# Patient Record
Sex: FEMALE | Race: WHITE | NOT HISPANIC OR LATINO | Employment: UNEMPLOYED | URBAN - METROPOLITAN AREA
[De-identification: names, ages, dates, MRNs, and addresses within clinical notes are randomized per-mention and may not be internally consistent; named-entity substitution may affect disease eponyms.]

---

## 2018-12-07 ENCOUNTER — LAB REQUISITION (OUTPATIENT)
Dept: LAB | Facility: HOSPITAL | Age: 18
End: 2018-12-07

## 2018-12-07 DIAGNOSIS — N39.0 URINARY TRACT INFECTION: ICD-10-CM

## 2018-12-07 PROCEDURE — 87086 URINE CULTURE/COLONY COUNT: CPT | Performed by: UROLOGY

## 2018-12-09 LAB — BACTERIA SPEC AEROBE CULT: NO GROWTH

## 2019-08-20 ENCOUNTER — OFFICE VISIT (OUTPATIENT)
Dept: OBSTETRICS AND GYNECOLOGY | Facility: CLINIC | Age: 19
End: 2019-08-20

## 2019-08-20 VITALS
BODY MASS INDEX: 20.15 KG/M2 | WEIGHT: 125.4 LBS | HEIGHT: 66 IN | SYSTOLIC BLOOD PRESSURE: 114 MMHG | DIASTOLIC BLOOD PRESSURE: 72 MMHG

## 2019-08-20 DIAGNOSIS — N94.3 PMS (PREMENSTRUAL SYNDROME): Primary | ICD-10-CM

## 2019-08-20 DIAGNOSIS — R10.2 VAGINAL PAIN: ICD-10-CM

## 2019-08-20 DIAGNOSIS — Z00.00 ANNUAL PHYSICAL EXAM: ICD-10-CM

## 2019-08-20 PROBLEM — N76.0 ACUTE VAGINITIS: Status: ACTIVE | Noted: 2019-08-20

## 2019-08-20 LAB
BILIRUB BLD-MCNC: NEGATIVE MG/DL
CLARITY, POC: CLEAR
COLOR UR: YELLOW
GLUCOSE UR STRIP-MCNC: NEGATIVE MG/DL
KETONES UR QL: NEGATIVE
LEUKOCYTE EST, POC: ABNORMAL
NITRITE UR-MCNC: NEGATIVE MG/ML
PH UR: 6 [PH] (ref 5–8)
PROT UR STRIP-MCNC: NEGATIVE MG/DL
RBC # UR STRIP: NEGATIVE /UL
SP GR UR: 1 (ref 1–1.03)
UROBILINOGEN UR QL: NORMAL

## 2019-08-20 PROCEDURE — 99204 OFFICE O/P NEW MOD 45 MIN: CPT | Performed by: OBSTETRICS & GYNECOLOGY

## 2019-08-20 RX ORDER — NORETHINDRONE ACETATE AND ETHINYL ESTRADIOL 1MG-20(21)
1 KIT ORAL DAILY
Qty: 84 TABLET | Refills: 3 | Status: SHIPPED | OUTPATIENT
Start: 2019-08-20 | End: 2021-03-23

## 2019-08-20 NOTE — PROGRESS NOTES
GYN Exam    CC- New gyn    Aggie Mitchell is a 18 y.o. female who presents to [facility] care.  Pt is a new pt to me. Periods are regular every 28-30 days, lasting 5 days. Dysmenorrhea:moderate, occurring first 1-2 days of flow. Cyclic symptoms include breast tenderness and insomnia. No intermenstrual bleeding, spotting, or discharge.  Patient is sexually active  yes - same sex relationship . Patient is satisfied with her contraception yes: none.  Pt reports that she is having cyclical joint pain of shoulder bones,knees and ankles and IBS exacerbations. Was seen by her physician and PRANAV and RF negative. Reporting recurrent UTIs- seen by Urology and had MRI with no findings. Pt reporting sensitivity of vulva and vagina. States that she has a hx of dysuria but no documented UTI. She reports burning. She is sexually active in same sex relationship and sex is uncomfortable to her.    OB History     No data available          Current contraception: none  History of abnormal Pap smear: no  History of abnormal mammogram: no  Family history of uterine, colon or ovarian cancer: paternal GGM  of colon cancer  Family history of breast cancer: no    Health Maintenance   Topic Date Due   • ANNUAL PHYSICAL  2001   • HPV VACCINES (1 - Female 3-dose series) 2013   • MENINGOCOCCAL VACCINE (Normal Risk) (1 - 2-dose series) 2014   • TDAP/TD VACCINES (1 - Tdap) 2017   • CHLAMYDIA SCREENING  2019   • INFLUENZA VACCINE  2019       History reviewed. No pertinent past medical history.    History reviewed. No pertinent surgical history.      Current Outpatient Medications:   •  norethindrone-ethinyl estradiol FE (JUNEL FE 1/20) 1-20 MG-MCG per tablet, Take 1 tablet by mouth Daily., Disp: 84 tablet, Rfl: 3  •  PEPPERMINT OIL PO, Take  by mouth., Disp: , Rfl:     No Known Allergies    Social History     Tobacco Use   • Smoking status: Former Smoker   Substance Use Topics   • Alcohol use: Not on file   •  "Drug use: Not on file       History reviewed. No pertinent family history.    Review of Systems   Constitutional: Negative for activity change, appetite change and unexpected weight change.   Gastrointestinal: Positive for constipation and diarrhea. Negative for abdominal distention and abdominal pain.   Genitourinary: Positive for dyspareunia, dysuria, frequency, menstrual problem and vaginal pain. Negative for vaginal bleeding and vaginal discharge.   Musculoskeletal: Positive for arthralgias, joint swelling and myalgias.   Neurological: Negative for weakness.   Psychiatric/Behavioral: Negative for dysphoric mood. The patient is not nervous/anxious.    All other systems reviewed and are negative.      /72   Ht 167.6 cm (66\")   Wt 56.9 kg (125 lb 6.4 oz)   LMP 08/08/2019 (Approximate)   BMI 20.24 kg/m²     Physical Exam   Constitutional: She is oriented to person, place, and time. She appears well-developed and well-nourished. No distress.   HENT:   Head: Normocephalic and atraumatic.   Cardiovascular: Normal rate and regular rhythm.   Pulmonary/Chest: Effort normal. No respiratory distress.   Abdominal: Soft. She exhibits no distension. There is no tenderness.   Genitourinary: There is no rash, tenderness, lesion or injury on the right labia. There is no rash, tenderness, lesion or injury on the left labia. There is tenderness in the vagina. No erythema or bleeding in the vagina. No foreign body in the vagina. No signs of injury around the vagina. No vaginal discharge found.   Musculoskeletal: Normal range of motion. She exhibits no edema, tenderness or deformity.   Neurological: She is alert and oriented to person, place, and time. No cranial nerve deficit. Coordination normal.   Skin: Skin is warm. She is not diaphoretic. No pallor.   Healed scars on forearms from cutting   Psychiatric: She has a normal mood and affect. Her behavior is normal. Judgment and thought content normal.   Vitals " reviewed.         Assessment/Plan    1) GYN HM: Check pap age 19. SBE demonstrated and encouraged.  2) Cyclical joint pain and IBS exacerbations (PMS): Will start OCPs to see if this helps with her symptoms. Pt instructed on how to take.  3) Contraception: Same sex relationship  4) Dysuria: Ucx noted to be negative. Check Ureaplasm/Mycoplasm  5) Hx of PTSD: Pt to notify me if sx worsen on OCPs. Pt reports that she is on no meds and is stable.  6) Smoking Status: nonsmoker  7) Follow up 6 weeks       Diagnoses and all orders for this visit:    PMS (premenstrual syndrome)    Annual physical exam  -     POC Urinalysis Dipstick    Vaginal pain  -     Genital Mycoplasmas BRIANNA, Swab - Swab, Vagina    Other orders  -     PEPPERMINT OIL PO; Take  by mouth.  -     norethindrone-ethinyl estradiol FE (JUNEL FE 1/20) 1-20 MG-MCG per tablet; Take 1 tablet by mouth Daily.          Tammy Childers, DO  8/25/2019  11:24 AM

## 2019-08-23 LAB
LABORATORY COMMENT REPORT: NORMAL
M GENITALIUM DNA SPEC QL NAA+PROBE: NEGATIVE
M HOMINIS DNA SPEC QL NAA+PROBE: NEGATIVE
UREAPLASMA DNA SPEC QL NAA+PROBE: NEGATIVE

## 2019-09-12 ENCOUNTER — TELEPHONE (OUTPATIENT)
Dept: OBSTETRICS AND GYNECOLOGY | Facility: CLINIC | Age: 19
End: 2019-09-12

## 2019-09-12 NOTE — TELEPHONE ENCOUNTER
Pt states her mental health is not doing well while on the BC she wants to know if she needs to stop it and try something new or if she shouold come in early and just talk to you.

## 2019-10-15 ENCOUNTER — OFFICE VISIT (OUTPATIENT)
Dept: OBSTETRICS AND GYNECOLOGY | Facility: CLINIC | Age: 19
End: 2019-10-15

## 2019-10-15 VITALS
SYSTOLIC BLOOD PRESSURE: 110 MMHG | HEIGHT: 66 IN | DIASTOLIC BLOOD PRESSURE: 70 MMHG | WEIGHT: 134 LBS | BODY MASS INDEX: 21.53 KG/M2

## 2019-10-15 DIAGNOSIS — R10.2 VAGINAL PAIN: Primary | ICD-10-CM

## 2019-10-15 DIAGNOSIS — N94.3 PMS (PREMENSTRUAL SYNDROME): ICD-10-CM

## 2019-10-15 PROCEDURE — 99213 OFFICE O/P EST LOW 20 MIN: CPT | Performed by: OBSTETRICS & GYNECOLOGY

## 2019-10-15 NOTE — PROGRESS NOTES
"PROBLEM VISIT    Chief Complaint: Cyclical IBS, joint pain and PMS      Aggie Mitchell is a 18 y.o. patient who presents for follow up of cyclical joint pain, IBS and PMS. Pt has hx of anxiety and depression. She was started on junel Fe 1/20 and began to experience anxiety and have a panic attack on meds and stopped after 1 week. Pt is still having some vaginal discomfort and having trouble having orgasm. Pt had a negative Ureaplasm.          The following portions of the patient's history were reviewed and updated as appropriate: allergies, current medications and problem list.    Review of Systems   Gastrointestinal: Positive for constipation and diarrhea.   Genitourinary: Positive for vaginal bleeding and vaginal pain. Negative for pelvic pain and vaginal discharge.       /70   Ht 167.6 cm (66\")   Wt 60.8 kg (134 lb)   LMP 10/15/2019 (Exact Date)   Breastfeeding? No   BMI 21.63 kg/m²     Physical Exam   Constitutional: She is oriented to person, place, and time. She appears well-developed and well-nourished. No distress.   Neurological: She is alert and oriented to person, place, and time. No cranial nerve deficit. Coordination normal.   Skin: She is not diaphoretic.   Psychiatric: She has a normal mood and affect. Her behavior is normal. Judgment and thought content normal.   Vitals reviewed.        Assessment/Plan   Aggie was seen today for contraception and pelvic pain.    Diagnoses and all orders for this visit:    Vaginal pain    PMS (premenstrual syndrome)    18-year-old with cyclical joint pain, IBS and PMS and vaginal pain    1) cyclical joint pain, IBS and PMS: Patient given Janel FE 1-20 and patient began to have anxiety and panic attacks on the medications.  She has a strong history of mental illness.  Patient stopped the meds after 1 week.  Discussed another medication with a different progesterone and it.  Patient and I both agree that her mental health is more important than the cyclical " joint pain, IBS and PMS symptoms.  If another medication is tried in the future then would consider Gianvi.  For now patient plans to see GI to work on her constipation symptoms of IBS to improve her symptoms.    2) vaginal pain: Ureaplasma/mycoplasma culture negative.  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  Patient given a sample of estrogen cream to apply to the clitoris and urethral area where she is having symptoms.  Patient to call if her symptoms are not better in approximately a month and will refer to pelvic PT for vulvodynia.               No Follow-up on file.      Tammy Childers DO    10/15/2019  9:19 AM

## 2020-02-04 ENCOUNTER — OFFICE VISIT (OUTPATIENT)
Dept: OBSTETRICS AND GYNECOLOGY | Facility: CLINIC | Age: 20
End: 2020-02-04

## 2020-02-04 VITALS
DIASTOLIC BLOOD PRESSURE: 72 MMHG | SYSTOLIC BLOOD PRESSURE: 110 MMHG | BODY MASS INDEX: 21.12 KG/M2 | HEIGHT: 66 IN | WEIGHT: 131.4 LBS

## 2020-02-04 DIAGNOSIS — N76.5 VAGINAL ULCER: Primary | ICD-10-CM

## 2020-02-04 DIAGNOSIS — Z13.9 SCREENING FOR CONDITION: ICD-10-CM

## 2020-02-04 LAB
B-HCG UR QL: NEGATIVE
BILIRUB BLD-MCNC: NEGATIVE MG/DL
CLARITY, POC: CLEAR
COLOR UR: YELLOW
GLUCOSE UR STRIP-MCNC: NEGATIVE MG/DL
INTERNAL NEGATIVE CONTROL: NEGATIVE
INTERNAL POSITIVE CONTROL: POSITIVE
KETONES UR QL: NEGATIVE
LEUKOCYTE EST, POC: NEGATIVE
Lab: 55
NITRITE UR-MCNC: NEGATIVE MG/ML
PH UR: 5 [PH] (ref 5–8)
PROT UR STRIP-MCNC: NEGATIVE MG/DL
RBC # UR STRIP: NEGATIVE /UL
SP GR UR: 1 (ref 1–1.03)
UROBILINOGEN UR QL: NORMAL

## 2020-02-04 PROCEDURE — 99213 OFFICE O/P EST LOW 20 MIN: CPT | Performed by: OBSTETRICS & GYNECOLOGY

## 2020-02-04 PROCEDURE — 81025 URINE PREGNANCY TEST: CPT | Performed by: OBSTETRICS & GYNECOLOGY

## 2020-02-04 RX ORDER — FLUTICASONE PROPIONATE 0.05 MG/G
OINTMENT TOPICAL 2 TIMES DAILY
Qty: 60 G | Refills: 1 | Status: SHIPPED | OUTPATIENT
Start: 2020-02-04 | End: 2020-02-11

## 2020-02-04 NOTE — PROGRESS NOTES
"PROBLEM VISIT    Chief Complaint: Urethral pain and joint pain.      Aggie Mitchell is a 19 y.o. patient who presents for follow up of difficulty with orgasm. Pt using estrogen cream to clitoral area but it did not help. Pt states she is still having pain and she states she thinks it is more related to her urethra more than her vaginal area. Pt has seen urology approx 1-2 years ago. Nothing was found on work up. Pt has had a recent Ureaplasm and Mycoplasm. Pt also complaining of diffuse joint pain arik when she sits in a care for a long time. Pt has had a normal PRANAV and RF. Pt has hx of LLQ/pelvic pain. Pt has had a normal US, CT scan and MRI last year.   Chief Complaint   Patient presents with   • Follow-up     Sexual Disfunction              The following portions of the patient's history were reviewed and updated as appropriate: allergies, current medications and problem list.    Review of Systems   Constitutional: Negative for appetite change, chills, fatigue, fever and unexpected weight change.   Gastrointestinal: Negative for abdominal distention, abdominal pain, anal bleeding, blood in stool, constipation, diarrhea, nausea and vomiting.   Genitourinary: Positive for vaginal pain. Negative for dyspareunia, dysuria, menstrual problem, pelvic pain, vaginal bleeding and vaginal discharge.       /72   Ht 167.6 cm (65.98\")   Wt 59.6 kg (131 lb 6.4 oz)   LMP  (LMP Unknown)   Breastfeeding No   BMI 21.22 kg/m²     Physical Exam   Constitutional: She appears well-developed and well-nourished. No distress.   Genitourinary: There is no rash, tenderness, lesion or injury on the right labia. There is no rash, tenderness, lesion or injury on the left labia. There is tenderness in the vagina. No erythema or bleeding in the vagina. No foreign body in the vagina. No signs of injury around the vagina. No vaginal discharge found.   Genitourinary Comments: Small ulcer noted between the clitoris and the urethra.  " Tenderness to palpation.   Neurological: She is alert. No cranial nerve deficit. Coordination normal.   Skin: Skin is warm. No rash noted. She is not diaphoretic. No erythema. No pallor.   Psychiatric: She has a normal mood and affect. Her behavior is normal. Judgment and thought content normal.   Vitals reviewed.        Assessment/Plan   Aggie was seen today for follow-up.    Diagnoses and all orders for this visit:    Vaginal ulcer  -     HSV 1 & 2 - Specific Antibody, IgG    Screening for condition  -     POC Urinalysis Dipstick  -     POC Pregnancy, Urine    Other orders  -     fluticasone (CUTIVATE) 0.005 % ointment; Apply  topically to the appropriate area as directed 2 (Two) Times a Day for 7 days.    19-year-old with vaginal ulcer    Check HSV 1 and 2  Ureaplasma and mycoplasma negative  Start Cutivate ointment twice daily for 1 week and then 1 time per day for another week then stop  And call if symptoms not resolved.               No follow-ups on file.      Tammy Childers,     2/4/2020  12:34 PM

## 2020-02-05 LAB
HSV1 IGG SER IA-ACNC: <0.91 INDEX (ref 0–0.9)
HSV2 IGG SER IA-ACNC: <0.91 INDEX (ref 0–0.9)

## 2021-03-23 ENCOUNTER — OFFICE VISIT (OUTPATIENT)
Dept: OBSTETRICS AND GYNECOLOGY | Facility: CLINIC | Age: 21
End: 2021-03-23

## 2021-03-23 VITALS
BODY MASS INDEX: 23.75 KG/M2 | WEIGHT: 147.8 LBS | HEIGHT: 66 IN | DIASTOLIC BLOOD PRESSURE: 64 MMHG | SYSTOLIC BLOOD PRESSURE: 112 MMHG

## 2021-03-23 DIAGNOSIS — Z11.51 SPECIAL SCREENING EXAMINATION FOR HUMAN PAPILLOMAVIRUS (HPV): ICD-10-CM

## 2021-03-23 DIAGNOSIS — Z13.9 SCREENING FOR UNSPECIFIED CONDITION: ICD-10-CM

## 2021-03-23 DIAGNOSIS — Z01.419 ROUTINE GYNECOLOGICAL EXAMINATION: Primary | ICD-10-CM

## 2021-03-23 DIAGNOSIS — Z01.419 PAP SMEAR, LOW-RISK: ICD-10-CM

## 2021-03-23 LAB
B-HCG UR QL: NEGATIVE
BILIRUB BLD-MCNC: NEGATIVE MG/DL
CLARITY, POC: CLEAR
COLOR UR: ABNORMAL
GLUCOSE UR STRIP-MCNC: NEGATIVE MG/DL
INTERNAL NEGATIVE CONTROL: NEGATIVE
INTERNAL POSITIVE CONTROL: POSITIVE
KETONES UR QL: NEGATIVE
LEUKOCYTE EST, POC: NEGATIVE
Lab: NORMAL
NITRITE UR-MCNC: NEGATIVE MG/ML
PH UR: 7 [PH] (ref 5–8)
PROT UR STRIP-MCNC: NEGATIVE MG/DL
RBC # UR STRIP: ABNORMAL /UL
SP GR UR: 1.02 (ref 1–1.03)
UROBILINOGEN UR QL: NORMAL

## 2021-03-23 PROCEDURE — 81025 URINE PREGNANCY TEST: CPT | Performed by: OBSTETRICS & GYNECOLOGY

## 2021-03-23 PROCEDURE — 99395 PREV VISIT EST AGE 18-39: CPT | Performed by: OBSTETRICS & GYNECOLOGY

## 2021-03-23 PROCEDURE — 81002 URINALYSIS NONAUTO W/O SCOPE: CPT | Performed by: OBSTETRICS & GYNECOLOGY

## 2021-03-23 RX ORDER — DROSPIRENONE AND ETHINYL ESTRADIOL 0.02-3(28)
1 KIT ORAL DAILY
Qty: 84 TABLET | Refills: 3 | Status: SHIPPED | OUTPATIENT
Start: 2021-03-23 | End: 2021-07-06

## 2021-03-23 NOTE — PROGRESS NOTES
GYN Annual Exam     CC- Here for annual exam.     Aggie Mitchell is a 20 y.o. female who presents for annual well woman exam. Periods are regular every 28-30 days, lasting 5 days. Dysmenorrhea:none. Pt reports her cycles are heavy.  No intermenstrual bleeding, spotting, or discharge.  Patient is sexually active  yes - same sex relationship . Patient is satisfied with her contraception yes - none. Pt reporting she is having a lot of PMS type of symptoms- reporting fatigue, dehydration, drop in her blood sugar. Joint pain is exacerbated during cycle. She was started on junel Fe 1/20 and began to experience anxiety and have a panic attack on meds and stopped after 1 week.    OB History    No obstetric history on file.         Current contraception: none  History of abnormal Pap smear: no  History of abnormal mammogram: no  Family history of uterine, colon or ovarian cancer: yes - Paternal GM. Maternal Aunt with ovarian cancer- unsure about BRCa status  Family history of breast cancer: no    Health Maintenance   Topic Date Due   • Annual Gynecologic Pelvic and Breast Exam  Never done   • ANNUAL PHYSICAL  Never done   • HPV VACCINES (1 - 2-dose series) Never done   • TDAP/TD VACCINES (2 - Td) 03/09/2019   • HEPATITIS C SCREENING  Never done   • CHLAMYDIA SCREENING  Never done   • PAP SMEAR  Never done   • INFLUENZA VACCINE  08/01/2020   • Pneumococcal Vaccine 0-64  Aged Out   • MENINGOCOCCAL VACCINE  Aged Out       History reviewed. No pertinent past medical history.    History reviewed. No pertinent surgical history.      Current Outpatient Medications:   •  PEPPERMINT OIL PO, Take  by mouth., Disp: , Rfl:   •  drospirenone-ethinyl estradiol (KATIE,GIANVI) 3-0.02 MG per tablet, Take 1 tablet by mouth Daily., Disp: 84 tablet, Rfl: 3    No Known Allergies    Social History     Tobacco Use   • Smoking status: Former Smoker   • Smokeless tobacco: Never Used   Vaping Use   • Vaping Use: Never used   Substance Use Topics   •  "Alcohol use: Never   • Drug use: Never       History reviewed. No pertinent family history.    Review of Systems    /64   Ht 167.6 cm (66\")   Wt 67 kg (147 lb 12.8 oz)   LMP 03/22/2021   BMI 23.86 kg/m²     Physical Exam  Vitals reviewed.   Constitutional:       Appearance: She is well-developed.   HENT:      Mouth/Throat:      Dentition: Normal dentition. No dental caries.   Cardiovascular:      Rate and Rhythm: Normal rate and regular rhythm.      Heart sounds: Normal heart sounds.   Pulmonary:      Effort: Pulmonary effort is normal. No respiratory distress.      Breath sounds: Normal breath sounds. No stridor. No wheezing.   Chest:      Breasts:         Right: No inverted nipple, mass, nipple discharge, skin change or tenderness.         Left: No inverted nipple, mass, nipple discharge, skin change or tenderness.   Abdominal:      General: There is no distension.      Palpations: Abdomen is soft. There is no mass.      Tenderness: There is no abdominal tenderness.   Genitourinary:     Labia:         Right: No rash, tenderness or lesion.         Left: No rash, tenderness or lesion.       Vagina: Bleeding present. No vaginal discharge or tenderness.      Cervix: No cervical motion tenderness, discharge or friability.      Uterus: Not deviated, not enlarged, not fixed and not tender.       Adnexa:         Right: No mass, tenderness or fullness.          Left: No mass, tenderness or fullness.     Musculoskeletal:         General: No tenderness. Normal range of motion.   Skin:     General: Skin is warm.      Findings: No erythema or rash.   Neurological:      Mental Status: She is alert and oriented to person, place, and time.      Cranial Nerves: No cranial nerve deficit.      Coordination: Coordination normal.   Psychiatric:         Behavior: Behavior normal.         Thought Content: Thought content normal.         Judgment: Judgment normal.            Assessment/Plan    1) GYN HM: Check pap.SBE " demonstrated and encouraged. Declines STD screening.  2)  cyclical joint pain, IBS and PMS: Patient given Junel FE 1-20 and patient began to have anxiety and panic attacks on the medications.  She has a strong history of mental illness.  Patient stopped the meds after 1 week.  Discussed another medication with a different progesterone and it.  Patient and I both agree that her mental health is more important than the cyclical joint pain, IBS and PMS symptoms.  Pt is interested in trying another BC. Rx Gianvi- pt to call if she has problems.3) Contraception: Same sex relationship  4) Diet and Exercise reviewed.  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          Tammy Childers DO  3/23/2021  11:29 EDT

## 2021-03-27 LAB
C TRACH RRNA CVX QL NAA+PROBE: NEGATIVE
CONV .: NORMAL
CYTOLOGIST CVX/VAG CYTO: NORMAL
CYTOLOGY CVX/VAG DOC CYTO: NORMAL
CYTOLOGY CVX/VAG DOC THIN PREP: NORMAL
DX ICD CODE: NORMAL
HIV 1 & 2 AB SER-IMP: NORMAL
Lab: NORMAL
N GONORRHOEA RRNA CVX QL NAA+PROBE: NEGATIVE
OTHER STN SPEC: NORMAL
RECOM F/U CVX/VAG CYTO: NORMAL
STAT OF ADQ CVX/VAG CYTO-IMP: NORMAL
T VAGINALIS RRNA SPEC QL NAA+PROBE: NEGATIVE

## 2021-04-16 ENCOUNTER — BULK ORDERING (OUTPATIENT)
Dept: CASE MANAGEMENT | Facility: OTHER | Age: 21
End: 2021-04-16

## 2021-04-16 DIAGNOSIS — Z23 IMMUNIZATION DUE: ICD-10-CM

## 2021-07-06 ENCOUNTER — HOSPITAL ENCOUNTER (OUTPATIENT)
Dept: GENERAL RADIOLOGY | Facility: HOSPITAL | Age: 21
Discharge: HOME | End: 2021-07-06

## 2021-07-06 ENCOUNTER — OFFICE VISIT (OUTPATIENT)
Dept: FAMILY MEDICINE CLINIC | Facility: CLINIC | Age: 21
End: 2021-07-06

## 2021-07-06 VITALS
DIASTOLIC BLOOD PRESSURE: 72 MMHG | WEIGHT: 146.8 LBS | OXYGEN SATURATION: 100 % | HEIGHT: 66 IN | HEART RATE: 70 BPM | SYSTOLIC BLOOD PRESSURE: 110 MMHG | TEMPERATURE: 97.7 F | BODY MASS INDEX: 23.59 KG/M2

## 2021-07-06 DIAGNOSIS — M54.9 CHRONIC BACK PAIN GREATER THAN 3 MONTHS DURATION: ICD-10-CM

## 2021-07-06 DIAGNOSIS — M54.9 CHRONIC BACK PAIN GREATER THAN 3 MONTHS DURATION: Primary | ICD-10-CM

## 2021-07-06 DIAGNOSIS — M54.2 CHRONIC NECK PAIN: ICD-10-CM

## 2021-07-06 DIAGNOSIS — G62.9 NEUROPATHY: ICD-10-CM

## 2021-07-06 DIAGNOSIS — G89.29 CHRONIC BACK PAIN GREATER THAN 3 MONTHS DURATION: ICD-10-CM

## 2021-07-06 DIAGNOSIS — Z00.00 ROUTINE GENERAL MEDICAL EXAMINATION AT A HEALTH CARE FACILITY: ICD-10-CM

## 2021-07-06 DIAGNOSIS — G89.29 CHRONIC BACK PAIN GREATER THAN 3 MONTHS DURATION: Primary | ICD-10-CM

## 2021-07-06 DIAGNOSIS — G89.29 CHRONIC NECK PAIN: ICD-10-CM

## 2021-07-06 LAB
ALBUMIN SERPL-MCNC: 4.8 G/DL (ref 3.5–5.2)
ALBUMIN/GLOB SERPL: 2.1 G/DL
ALP SERPL-CCNC: 84 U/L (ref 39–117)
ALT SERPL-CCNC: 9 U/L (ref 1–33)
AST SERPL-CCNC: 12 U/L (ref 1–32)
BASOPHILS # BLD AUTO: 0.03 10*3/MM3 (ref 0–0.2)
BASOPHILS NFR BLD AUTO: 0.6 % (ref 0–1.5)
BILIRUB SERPL-MCNC: 0.6 MG/DL (ref 0–1.2)
BUN SERPL-MCNC: 8 MG/DL (ref 6–20)
BUN/CREAT SERPL: 14 (ref 7–25)
CALCIUM SERPL-MCNC: 9.6 MG/DL (ref 8.6–10.5)
CHLORIDE SERPL-SCNC: 105 MMOL/L (ref 98–107)
CHOLEST SERPL-MCNC: 177 MG/DL (ref 0–200)
CO2 SERPL-SCNC: 24.7 MMOL/L (ref 22–29)
CREAT SERPL-MCNC: 0.57 MG/DL (ref 0.57–1)
EOSINOPHIL # BLD AUTO: 0.06 10*3/MM3 (ref 0–0.4)
EOSINOPHIL NFR BLD AUTO: 1.1 % (ref 0.3–6.2)
ERYTHROCYTE [DISTWIDTH] IN BLOOD BY AUTOMATED COUNT: 12.1 % (ref 12.3–15.4)
GLOBULIN SER CALC-MCNC: 2.3 GM/DL
GLUCOSE SERPL-MCNC: 92 MG/DL (ref 65–99)
HCT VFR BLD AUTO: 42.8 % (ref 34–46.6)
HDLC SERPL-MCNC: 49 MG/DL (ref 40–60)
HGB BLD-MCNC: 14.2 G/DL (ref 12–15.9)
IMM GRANULOCYTES # BLD AUTO: 0.02 10*3/MM3 (ref 0–0.05)
IMM GRANULOCYTES NFR BLD AUTO: 0.4 % (ref 0–0.5)
LDLC SERPL CALC-MCNC: 111 MG/DL (ref 0–100)
LDLC/HDLC SERPL: 2.24 {RATIO}
LYMPHOCYTES # BLD AUTO: 1.92 10*3/MM3 (ref 0.7–3.1)
LYMPHOCYTES NFR BLD AUTO: 36.4 % (ref 19.6–45.3)
MCH RBC QN AUTO: 29.9 PG (ref 26.6–33)
MCHC RBC AUTO-ENTMCNC: 33.2 G/DL (ref 31.5–35.7)
MCV RBC AUTO: 90.1 FL (ref 79–97)
MONOCYTES # BLD AUTO: 0.37 10*3/MM3 (ref 0.1–0.9)
MONOCYTES NFR BLD AUTO: 7 % (ref 5–12)
NEUTROPHILS # BLD AUTO: 2.87 10*3/MM3 (ref 1.7–7)
NEUTROPHILS NFR BLD AUTO: 54.5 % (ref 42.7–76)
NRBC BLD AUTO-RTO: 0 /100 WBC (ref 0–0.2)
PLATELET # BLD AUTO: 288 10*3/MM3 (ref 140–450)
POTASSIUM SERPL-SCNC: 4.5 MMOL/L (ref 3.5–5.2)
PROT SERPL-MCNC: 7.1 G/DL (ref 6–8.5)
RBC # BLD AUTO: 4.75 10*6/MM3 (ref 3.77–5.28)
SODIUM SERPL-SCNC: 140 MMOL/L (ref 136–145)
TRIGL SERPL-MCNC: 91 MG/DL (ref 0–150)
TSH SERPL DL<=0.005 MIU/L-ACNC: 0.8 UIU/ML (ref 0.27–4.2)
VIT B12 SERPL-MCNC: 450 PG/ML (ref 211–946)
VLDLC SERPL CALC-MCNC: 17 MG/DL (ref 5–40)
WBC # BLD AUTO: 5.27 10*3/MM3 (ref 3.4–10.8)

## 2021-07-06 PROCEDURE — 72072 X-RAY EXAM THORAC SPINE 3VWS: CPT

## 2021-07-06 PROCEDURE — 72040 X-RAY EXAM NECK SPINE 2-3 VW: CPT

## 2021-07-06 PROCEDURE — 99204 OFFICE O/P NEW MOD 45 MIN: CPT | Performed by: NURSE PRACTITIONER

## 2021-07-06 PROCEDURE — 72100 X-RAY EXAM L-S SPINE 2/3 VWS: CPT
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
## 2021-07-13 ENCOUNTER — OFFICE VISIT (OUTPATIENT)
Dept: OBSTETRICS AND GYNECOLOGY | Facility: CLINIC | Age: 21
End: 2021-07-13

## 2021-07-13 VITALS
DIASTOLIC BLOOD PRESSURE: 72 MMHG | BODY MASS INDEX: 23.18 KG/M2 | SYSTOLIC BLOOD PRESSURE: 112 MMHG | HEIGHT: 66 IN | WEIGHT: 144.2 LBS

## 2021-07-13 DIAGNOSIS — Z13.9 SCREENING FOR UNSPECIFIED CONDITION: ICD-10-CM

## 2021-07-13 DIAGNOSIS — Z01.419 ROUTINE GYNECOLOGICAL EXAMINATION: ICD-10-CM

## 2021-07-13 DIAGNOSIS — R10.32 LLQ PAIN: Primary | ICD-10-CM

## 2021-07-13 DIAGNOSIS — Z01.419 PAP SMEAR, LOW-RISK: ICD-10-CM

## 2021-07-13 DIAGNOSIS — Z11.51 SPECIAL SCREENING EXAMINATION FOR HUMAN PAPILLOMAVIRUS (HPV): ICD-10-CM

## 2021-07-13 LAB
B-HCG UR QL: NEGATIVE
BILIRUB BLD-MCNC: NEGATIVE MG/DL
CLARITY, POC: CLEAR
COLOR UR: YELLOW
GLUCOSE UR STRIP-MCNC: NEGATIVE MG/DL
INTERNAL NEGATIVE CONTROL: NORMAL
INTERNAL POSITIVE CONTROL: NORMAL
KETONES UR QL: NEGATIVE
LEUKOCYTE EST, POC: NEGATIVE
Lab: NORMAL
NITRITE UR-MCNC: NEGATIVE MG/ML
PH UR: 5 [PH] (ref 5–8)
PROT UR STRIP-MCNC: NEGATIVE MG/DL
RBC # UR STRIP: NEGATIVE /UL
SP GR UR: 1 (ref 1–1.03)
UROBILINOGEN UR QL: NORMAL

## 2021-07-13 PROCEDURE — 81025 URINE PREGNANCY TEST: CPT | Performed by: OBSTETRICS & GYNECOLOGY

## 2021-07-13 PROCEDURE — 99213 OFFICE O/P EST LOW 20 MIN: CPT | Performed by: OBSTETRICS & GYNECOLOGY

## 2021-07-13 RX ORDER — DROSPIRENONE AND ETHINYL ESTRADIOL 0.02-3(28)
1 KIT ORAL DAILY
Qty: 28 TABLET | Refills: 11 | Status: SHIPPED | OUTPATIENT
Start: 2021-07-13 | End: 2022-08-02
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
## 2021-09-21 ENCOUNTER — OFFICE VISIT (OUTPATIENT)
Dept: FAMILY MEDICINE CLINIC | Facility: CLINIC | Age: 21
End: 2021-09-21

## 2021-09-21 VITALS
TEMPERATURE: 96.2 F | DIASTOLIC BLOOD PRESSURE: 75 MMHG | BODY MASS INDEX: 22.66 KG/M2 | HEART RATE: 103 BPM | OXYGEN SATURATION: 92 % | WEIGHT: 141 LBS | SYSTOLIC BLOOD PRESSURE: 132 MMHG | HEIGHT: 66 IN

## 2021-09-21 DIAGNOSIS — R10.11 RIGHT UPPER QUADRANT PAIN: Primary | ICD-10-CM

## 2021-09-21 PROCEDURE — 99214 OFFICE O/P EST MOD 30 MIN: CPT | Performed by: NURSE PRACTITIONER
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
## 2021-10-03 ENCOUNTER — HOSPITAL ENCOUNTER (EMERGENCY)
Facility: HOSPITAL | Age: 21
Discharge: HOME | End: 2021-10-04
Attending: EMERGENCY MEDICINE | Admitting: EMERGENCY MEDICINE

## 2021-10-03 ENCOUNTER — APPOINTMENT (OUTPATIENT)
Dept: ULTRASOUND IMAGING | Facility: HOSPITAL | Age: 21
End: 2021-10-03

## 2021-10-03 DIAGNOSIS — R10.11 RUQ ABDOMINAL PAIN: Primary | ICD-10-CM

## 2021-10-03 LAB
ALBUMIN SERPL-MCNC: 4.5 G/DL (ref 3.5–5.2)
ALBUMIN/GLOB SERPL: 1.8 G/DL
ALP SERPL-CCNC: 53 U/L (ref 39–117)
ALT SERPL W P-5'-P-CCNC: 17 U/L (ref 1–33)
ANION GAP SERPL CALCULATED.3IONS-SCNC: 9.5 MMOL/L (ref 5–15)
AST SERPL-CCNC: 16 U/L (ref 1–32)
BACTERIA UR QL AUTO: ABNORMAL /HPF
BASOPHILS # BLD AUTO: 0.04 10*3/MM3 (ref 0–0.2)
BASOPHILS NFR BLD AUTO: 0.8 % (ref 0–1.5)
BILIRUB SERPL-MCNC: 0.2 MG/DL (ref 0–1.2)
BILIRUB UR QL STRIP: NEGATIVE
BUN SERPL-MCNC: 9 MG/DL (ref 6–20)
BUN/CREAT SERPL: 13 (ref 7–25)
CALCIUM SPEC-SCNC: 9.4 MG/DL (ref 8.6–10.5)
CHLORIDE SERPL-SCNC: 106 MMOL/L (ref 98–107)
CLARITY UR: CLEAR
CO2 SERPL-SCNC: 23.5 MMOL/L (ref 22–29)
COLOR UR: YELLOW
CREAT SERPL-MCNC: 0.69 MG/DL (ref 0.57–1)
DEPRECATED RDW RBC AUTO: 39.1 FL (ref 37–54)
EOSINOPHIL # BLD AUTO: 0.05 10*3/MM3 (ref 0–0.4)
EOSINOPHIL NFR BLD AUTO: 1 % (ref 0.3–6.2)
ERYTHROCYTE [DISTWIDTH] IN BLOOD BY AUTOMATED COUNT: 12 % (ref 12.3–15.4)
GFR SERPL CREATININE-BSD FRML MDRD: 106 ML/MIN/1.73
GLOBULIN UR ELPH-MCNC: 2.5 GM/DL
GLUCOSE SERPL-MCNC: 87 MG/DL (ref 65–99)
GLUCOSE UR STRIP-MCNC: NEGATIVE MG/DL
HCG SERPL QL: NEGATIVE
HCT VFR BLD AUTO: 37.5 % (ref 34–46.6)
HGB BLD-MCNC: 12.5 G/DL (ref 12–15.9)
HGB UR QL STRIP.AUTO: NEGATIVE
HYALINE CASTS UR QL AUTO: ABNORMAL /LPF
IMM GRANULOCYTES # BLD AUTO: 0.01 10*3/MM3 (ref 0–0.05)
IMM GRANULOCYTES NFR BLD AUTO: 0.2 % (ref 0–0.5)
KETONES UR QL STRIP: NEGATIVE
LEUKOCYTE ESTERASE UR QL STRIP.AUTO: ABNORMAL
LIPASE SERPL-CCNC: 42 U/L (ref 13–60)
LYMPHOCYTES # BLD AUTO: 2.63 10*3/MM3 (ref 0.7–3.1)
LYMPHOCYTES NFR BLD AUTO: 52.3 % (ref 19.6–45.3)
MAGNESIUM SERPL-MCNC: 2.1 MG/DL (ref 1.6–2.6)
MCH RBC QN AUTO: 29.8 PG (ref 26.6–33)
MCHC RBC AUTO-ENTMCNC: 33.3 G/DL (ref 31.5–35.7)
MCV RBC AUTO: 89.5 FL (ref 79–97)
MONOCYTES # BLD AUTO: 0.29 10*3/MM3 (ref 0.1–0.9)
MONOCYTES NFR BLD AUTO: 5.8 % (ref 5–12)
NEUTROPHILS NFR BLD AUTO: 2.01 10*3/MM3 (ref 1.7–7)
NEUTROPHILS NFR BLD AUTO: 39.9 % (ref 42.7–76)
NITRITE UR QL STRIP: NEGATIVE
NRBC BLD AUTO-RTO: 0 /100 WBC (ref 0–0.2)
PH UR STRIP.AUTO: 6 [PH] (ref 5–8)
PLATELET # BLD AUTO: 255 10*3/MM3 (ref 140–450)
PMV BLD AUTO: 9.9 FL (ref 6–12)
POTASSIUM SERPL-SCNC: 3.9 MMOL/L (ref 3.5–5.2)
PROT SERPL-MCNC: 7 G/DL (ref 6–8.5)
PROT UR QL STRIP: NEGATIVE
RBC # BLD AUTO: 4.19 10*6/MM3 (ref 3.77–5.28)
RBC # UR: ABNORMAL /HPF
REF LAB TEST METHOD: ABNORMAL
SODIUM SERPL-SCNC: 139 MMOL/L (ref 136–145)
SP GR UR STRIP: 1.01 (ref 1–1.03)
SQUAMOUS #/AREA URNS HPF: ABNORMAL /HPF
UROBILINOGEN UR QL STRIP: ABNORMAL
WBC # BLD AUTO: 5.03 10*3/MM3 (ref 3.4–10.8)
WBC UR QL AUTO: ABNORMAL /HPF

## 2021-10-03 PROCEDURE — 84703 CHORIONIC GONADOTROPIN ASSAY: CPT | Performed by: PHYSICIAN ASSISTANT

## 2021-10-03 PROCEDURE — 83690 ASSAY OF LIPASE: CPT | Performed by: PHYSICIAN ASSISTANT

## 2021-10-03 PROCEDURE — 85025 COMPLETE CBC W/AUTO DIFF WBC: CPT | Performed by: PHYSICIAN ASSISTANT

## 2021-10-03 PROCEDURE — 81001 URINALYSIS AUTO W/SCOPE: CPT | Performed by: PHYSICIAN ASSISTANT

## 2021-10-03 PROCEDURE — 25010000002 ONDANSETRON PER 1 MG: Performed by: PHYSICIAN ASSISTANT

## 2021-10-03 PROCEDURE — 76705 ECHO EXAM OF ABDOMEN: CPT

## 2021-10-03 PROCEDURE — 83735 ASSAY OF MAGNESIUM: CPT | Performed by: PHYSICIAN ASSISTANT

## 2021-10-03 PROCEDURE — 80053 COMPREHEN METABOLIC PANEL: CPT | Performed by: PHYSICIAN ASSISTANT

## 2021-10-03 PROCEDURE — 96374 THER/PROPH/DIAG INJ IV PUSH: CPT

## 2021-10-03 PROCEDURE — 99283 EMERGENCY DEPT VISIT LOW MDM: CPT

## 2021-10-03 RX ORDER — PANTOPRAZOLE SODIUM 40 MG/1
40 TABLET, DELAYED RELEASE ORAL DAILY
Qty: 30 TABLET | Refills: 0 | Status: SHIPPED | OUTPATIENT
Start: 2021-10-03 | End: 2022-11-01

## 2021-10-03 RX ORDER — ONDANSETRON 4 MG/1
4 TABLET, ORALLY DISINTEGRATING ORAL EVERY 8 HOURS PRN
Qty: 12 TABLET | Refills: 0 | Status: SHIPPED | OUTPATIENT
Start: 2021-10-03

## 2021-10-03 RX ORDER — ONDANSETRON 2 MG/ML
4 INJECTION INTRAMUSCULAR; INTRAVENOUS ONCE
Status: COMPLETED | OUTPATIENT
Start: 2021-10-03 | End: 2021-10-03

## 2021-10-03 RX ORDER — SODIUM CHLORIDE 0.9 % (FLUSH) 0.9 %
10 SYRINGE (ML) INJECTION AS NEEDED
Status: DISCONTINUED | OUTPATIENT
Start: 2021-10-03 | End: 2021-10-04 | Stop reason: HOSPADM

## 2021-10-03 RX ADMIN — SODIUM CHLORIDE, POTASSIUM CHLORIDE, SODIUM LACTATE AND CALCIUM CHLORIDE 1000 ML: 600; 310; 30; 20 INJECTION, SOLUTION INTRAVENOUS at 21:40

## 2021-10-03 RX ADMIN — SODIUM CHLORIDE, PRESERVATIVE FREE 10 ML: 5 INJECTION INTRAVENOUS at 21:39

## 2021-10-03 RX ADMIN — ONDANSETRON 4 MG: 2 INJECTION INTRAMUSCULAR; INTRAVENOUS at 21:38

## 2021-10-03 NOTE — ED NOTES
Pt ambulatory to triage from home with c/o ruq pain that's been going on for 7 months, due to have gallbladder US on 10/13, but pain has gotten worse.  Pt states nausea but no vomiting. Pt is worse after eating.  Pt wearing mask in triage.  Triage personnel wore appropriate PPE       Nancy Delatorre, RN  10/03/21 2003

## 2021-10-04 VITALS
DIASTOLIC BLOOD PRESSURE: 81 MMHG | BODY MASS INDEX: 23.3 KG/M2 | WEIGHT: 145 LBS | HEIGHT: 66 IN | SYSTOLIC BLOOD PRESSURE: 113 MMHG | TEMPERATURE: 98.4 F | HEART RATE: 87 BPM | OXYGEN SATURATION: 99 % | RESPIRATION RATE: 16 BRPM

## 2021-10-04 NOTE — ED NOTES
Patient was placed in face mask in first look. Patient was wearing facemask when I entered the room and throughout our encounter. I wore full protective equipment throughout this patient encounter including a face mask, and gloves. Hand hygiene was performed before donning protective equipment and after removal when leaving the room.     Taniya Cramer RN  10/03/21 2019

## 2021-10-04 NOTE — ED NOTES
"Pt states \"The pain has progressively gotten worse (reffering to RUQ, gallbladder), but in the last two weeks I have noticed the pain significantly increasing each day. Pt states \"I could not get the ultrasound of gallbladder moved up.\" It is preventing me from sleeping and eating normally. I messaged my Dr on MyChart and they advised me to come to the ER if pain gets worse this was on Friday. I have been nauseous, but haven't vomited. And \"I have diarrhea daily.\"   Pt reports taking tylenol for the pain. Pt denies chest pain, SOB, cough, or fever. Pt in NAD at this time, VSS.    Patient was placed in face mask during first look triage.  Patient was wearing a face mask throughout encounter.  I wore personal protective equipment throughout the encounter.  Hand hygiene was performed before and after patient encounter.       Radha Carlos, RN  10/03/21 2028       Radha Carlos RN  10/03/21 2041    "

## 2021-10-04 NOTE — ED PROVIDER NOTES
EMERGENCY DEPARTMENT ENCOUNTER    Room Number:  01/01  Date of encounter:  10/3/2021  PCP: Marion Jaquez APRN  Historian: Patient      HPI:  Chief Complaint: Abdominal pain      Context: Aggie Mitchell is a 20 y.o. female who presents to the ED c/o abdominal pain.  Patient states that her symptoms have been present for at least 7 months, the pain is located in her right upper quadrant of her abdomen.  Patient describes the pain as an uncomfortable pain or fullness.  It radiates to the back, has been constant and getting progressively worse to where it is now becoming more of a shooting and burning pain.  States that it is associated with nausea, fatigue, and diarrhea.  States that the pain is worse after eating and with exercise.  Nothing appears to make it better.  Patient denies any vomiting, UTI symptoms, vaginal symptoms, fever, chills, chest pain, or shortness of breath.  Patient contacted her primary care doctor and had her scheduled for an ultrasound next week, but has not had any evaluation at this time.      PAST MEDICAL HISTORY  Active Ambulatory Problems     Diagnosis Date Noted   • Vaginal pain 08/20/2019   • PMS (premenstrual syndrome) 08/20/2019     Resolved Ambulatory Problems     Diagnosis Date Noted   • No Resolved Ambulatory Problems     Past Medical History:   Diagnosis Date   • IBS (irritable bowel syndrome)          PAST SURGICAL HISTORY  History reviewed. No pertinent surgical history.      FAMILY HISTORY  History reviewed. No pertinent family history.      SOCIAL HISTORY  Social History     Socioeconomic History   • Marital status: Single     Spouse name: Not on file   • Number of children: Not on file   • Years of education: Not on file   • Highest education level: Not on file   Tobacco Use   • Smoking status: Former Smoker   • Smokeless tobacco: Never Used   Vaping Use   • Vaping Use: Never used   Substance and Sexual Activity   • Alcohol use: Never   • Drug use: Never   • Sexual  activity: Yes     Partners: Male         ALLERGIES  Patient has no known allergies.        REVIEW OF SYSTEMS  Review of Systems     All systems reviewed and negative except for those discussed in HPI.       PHYSICAL EXAM    I have reviewed the triage vital signs and nursing notes.    ED Triage Vitals   Temp Heart Rate Resp BP SpO2   10/03/21 2002 10/03/21 2002 10/03/21 2002 10/03/21 2023 10/03/21 2002   98.4 °F (36.9 °C) 112 16 132/87 98 %      Temp src Heart Rate Source Patient Position BP Location FiO2 (%)   10/03/21 2002 10/03/21 2002 10/03/21 2023 10/03/21 2023 --   Temporal Monitor Lying Right arm        Physical Exam  GENERAL: Alert and oriented x3, not distressed  HENT: nares patent  EYES: no scleral icterus PERRL, EOMI  CV: regular rhythm, regular rate  RESPIRATORY: normal effort  ABDOMEN: RUQ tenderness, positive Andrade sign, no rebound or guarding, normal bowel sounds  MUSCULOSKELETAL: no deformity  NEURO: alert, moves all extremities, follows commands  SKIN: warm, dry        LAB RESULTS  Recent Results (from the past 24 hour(s))   Comprehensive Metabolic Panel    Collection Time: 10/03/21  9:32 PM    Specimen: Blood   Result Value Ref Range    Glucose 87 65 - 99 mg/dL    BUN 9 6 - 20 mg/dL    Creatinine 0.69 0.57 - 1.00 mg/dL    Sodium 139 136 - 145 mmol/L    Potassium 3.9 3.5 - 5.2 mmol/L    Chloride 106 98 - 107 mmol/L    CO2 23.5 22.0 - 29.0 mmol/L    Calcium 9.4 8.6 - 10.5 mg/dL    Total Protein 7.0 6.0 - 8.5 g/dL    Albumin 4.50 3.50 - 5.20 g/dL    ALT (SGPT) 17 1 - 33 U/L    AST (SGOT) 16 1 - 32 U/L    Alkaline Phosphatase 53 39 - 117 U/L    Total Bilirubin 0.2 0.0 - 1.2 mg/dL    eGFR Non African Amer 106 >60 mL/min/1.73    Globulin 2.5 gm/dL    A/G Ratio 1.8 g/dL    BUN/Creatinine Ratio 13.0 7.0 - 25.0    Anion Gap 9.5 5.0 - 15.0 mmol/L   Lipase    Collection Time: 10/03/21  9:32 PM    Specimen: Blood   Result Value Ref Range    Lipase 42 13 - 60 U/L   hCG, Serum, Qualitative    Collection Time:  10/03/21  9:32 PM    Specimen: Blood   Result Value Ref Range    HCG Qualitative Negative Negative   Magnesium    Collection Time: 10/03/21  9:32 PM    Specimen: Blood   Result Value Ref Range    Magnesium 2.1 1.6 - 2.6 mg/dL   CBC Auto Differential    Collection Time: 10/03/21  9:32 PM    Specimen: Blood   Result Value Ref Range    WBC 5.03 3.40 - 10.80 10*3/mm3    RBC 4.19 3.77 - 5.28 10*6/mm3    Hemoglobin 12.5 12.0 - 15.9 g/dL    Hematocrit 37.5 34.0 - 46.6 %    MCV 89.5 79.0 - 97.0 fL    MCH 29.8 26.6 - 33.0 pg    MCHC 33.3 31.5 - 35.7 g/dL    RDW 12.0 (L) 12.3 - 15.4 %    RDW-SD 39.1 37.0 - 54.0 fl    MPV 9.9 6.0 - 12.0 fL    Platelets 255 140 - 450 10*3/mm3    Neutrophil % 39.9 (L) 42.7 - 76.0 %    Lymphocyte % 52.3 (H) 19.6 - 45.3 %    Monocyte % 5.8 5.0 - 12.0 %    Eosinophil % 1.0 0.3 - 6.2 %    Basophil % 0.8 0.0 - 1.5 %    Immature Grans % 0.2 0.0 - 0.5 %    Neutrophils, Absolute 2.01 1.70 - 7.00 10*3/mm3    Lymphocytes, Absolute 2.63 0.70 - 3.10 10*3/mm3    Monocytes, Absolute 0.29 0.10 - 0.90 10*3/mm3    Eosinophils, Absolute 0.05 0.00 - 0.40 10*3/mm3    Basophils, Absolute 0.04 0.00 - 0.20 10*3/mm3    Immature Grans, Absolute 0.01 0.00 - 0.05 10*3/mm3    nRBC 0.0 0.0 - 0.2 /100 WBC   Urinalysis With Microscopic If Indicated (No Culture) - Urine, Clean Catch    Collection Time: 10/03/21 10:53 PM    Specimen: Urine, Clean Catch   Result Value Ref Range    Color, UA Yellow Yellow, Straw    Appearance, UA Clear Clear    pH, UA 6.0 5.0 - 8.0    Specific Gravity, UA 1.013 1.005 - 1.030    Glucose, UA Negative Negative    Ketones, UA Negative Negative    Bilirubin, UA Negative Negative    Blood, UA Negative Negative    Protein, UA Negative Negative    Leuk Esterase, UA Moderate (2+) (A) Negative    Nitrite, UA Negative Negative    Urobilinogen, UA 1.0 E.U./dL 0.2 - 1.0 E.U./dL   Urinalysis, Microscopic Only - Urine, Clean Catch    Collection Time: 10/03/21 10:53 PM    Specimen: Urine, Clean Catch   Result  Value Ref Range    RBC, UA 0-2 None Seen, 0-2 /HPF    WBC, UA 6-12 (A) None Seen, 0-2 /HPF    Bacteria, UA 1+ (A) None Seen /HPF    Squamous Epithelial Cells, UA 3-6 (A) None Seen, 0-2 /HPF    Hyaline Casts, UA 0-2 None Seen /LPF    Methodology Automated Microscopy        Ordered the above labs and independently reviewed the results.        RADIOLOGY  US Gallbladder    Result Date: 10/3/2021  GALLBLADDER ULTRASOUND  HISTORY: Right upper quadrant pain  COMPARISON: None available.  TECHNIQUE: Grayscale and color Doppler sonographic images were obtained through the right upper quadrant  FINDINGS: Visualized portions of the pancreas are unremarkable. Liver is homogeneous in echotexture. No focal hepatic lesions are seen. There is no intra or extrahepatic biliary dilatation. Common bile duct measures 3 mm in diameter. No stones or sludge are seen within the gallbladder, and there is no gallbladder wall thickening or pericholecystic fluid. Gallbladder wall measures 2 mm in thickness. Right kidney measures 10.8 x 4.1 x 4.37 cm. There is no hydronephrosis. Renal echotexture is normal.      Normal gallbladder ultrasound.  This report was finalized on 10/3/2021 9:57 PM by Dr. Lupe Rodriguez M.D.        I ordered the above noted radiological studies. Reviewed by me and discussed with radiologist.  See dictation for official radiology interpretation.      PROCEDURES    Procedures      MEDICATIONS GIVEN IN ER    Medications   sodium chloride 0.9 % flush 10 mL (10 mL Intravenous Given 10/3/21 2139)   lactated ringers bolus 1,000 mL (0 mL Intravenous Stopped 10/3/21 2253)   ondansetron (ZOFRAN) injection 4 mg (4 mg Intravenous Given 10/3/21 2138)         PROGRESS, DATA ANALYSIS, CONSULTS, AND MEDICAL DECISION MAKING    All labs have been independently reviewed by me.  All radiology studies have been reviewed by me and discussed with radiologist dictating the report.   EKG's independently viewed and interpreted by me.   Discussion below represents my analysis of pertinent findings related to patient's condition, differential diagnosis, treatment plan and final disposition.    DDx: Includes but not limited to acute cholecystitis, cholelithiasis, chronic cholecystitis, appendicitis, kidney stone, pyelonephritis, UTI    ED Course as of Oct 03 2341   Sun Oct 03, 2021   2211 WBC: 5.03 [BASHIR]   2211 Hemoglobin: 12.5 [BASHIR]   2211 Platelets: 255 [BASHIR]   2211 Glucose: 87 [BASHIR]   2211 BUN: 9 [BASHIR]   2211 Creatinine: 0.69 [BASHIR]   2211 Sodium: 139 [BASHIR]   2211 Potassium: 3.9 [BASHIR]   2211 ALT (SGPT): 17 [BASHIR]   2211 AST (SGOT): 16 [BASHIR]   2211 Lipase: 42 [BASHIR]   2211 HCG Qualitative: Negative [BASHIR]   2334 Patient rechecked, resting comfortably bed, no acute distress.  Discussed results, diagnosis, treatment plan, and need for further evaluation with surgery.    [BASHIR]      ED Course User Index  [BASHIR] Camilo Alicea PA       MDM: Patient has a normal WBC count, lipase, and LFTs, and her ultrasound does not show any evidence for, and her ultrasound does not show any evidence for acute cholecystitis.  Her urine appears contaminated and she does not have any symptoms of a UTI.  Patient has been given referral information for surgery and recommendation for possible outpatient HIDA scan.  Patient's vital signs are stable, she is safe for discharge home.    PPE: The patient wore a surgical mask throughout the entire patient encounter. I wore an N95.    AS OF 23:41 EDT VITALS:    BP - 113/81  HR - 92  TEMP - 98.4 °F (36.9 °C) (Temporal)  O2 SATS - 98%        DIAGNOSIS  Final diagnoses:   RUQ abdominal pain         DISPOSITION  DISCHARGE    Patient discharged in stable condition.    Reviewed implications of results, diagnosis, meds, responsibility to follow up, warning signs and symptoms of possible worsening, potential complications and reasons to return to ER.    Patient/Family voiced understanding of above instructions.    Discussed plan for discharge, as there  is no emergent indication for admission. Patient referred to primary care provider for BP management due to today's BP. Pt/family is agreeable and understands need for follow up and repeat testing.  Pt is aware that discharge does not mean that nothing is wrong but it indicates no emergency is present that requires admission and they must continue care with follow-up as given below or physician of their choice.     FOLLOW-UP  Bernard Tracey Jr., MD  [address]  [facility] [id number] [phone]    Schedule an appointment as soon as possible for a visit       Marion Jaquez, APRN  [address]  [address]  [facility] [id number] [phone]    Schedule an appointment as soon as possible for a visit            Medication List      New Prescriptions    ondansetron ODT 4 MG disintegrating tablet  Commonly known as: Zofran ODT  Place 1 tablet on the tongue Every 8 (Eight) Hours As Needed for Nausea.     pantoprazole 40 MG EC tablet  Commonly known as: PROTONIX  Take 1 tablet by mouth Daily.        Stop    PEPPERMINT OIL PO           Where to Get Your Medications      These medications were sent to [id number] - [place], [place] - [address] AT [address] - [phone]  - [phone]   [address] [place] [id number]    Phone: [phone]   · ondansetron ODT 4 MG disintegrating tablet  · pantoprazole 40 MG EC tablet              Camilo Alicea PA  10/03/21 4372

## 2021-10-04 NOTE — ED PROVIDER NOTES
MD ATTESTATION NOTE    The NADER and I have discussed this patient's history, physical exam, and treatment plan.  I have reviewed the documentation and personally had a face to face interaction with the patient. I affirm the documentation and agree with the treatment and plan.  The attached note describes my personal findings.      Aggie Mitchell is a 20 y.o. female who presents to the ED c/o right upper quadrant abdominal pain has been ongoing for 7 months.  It worsens with p.o. intake.      On exam:  Mild right upper quadrant abdominal tenderness without rebound or guarding    Labs  Recent Results (from the past 24 hour(s))   Comprehensive Metabolic Panel    Collection Time: 10/03/21  9:32 PM    Specimen: Blood   Result Value Ref Range    Glucose 87 65 - 99 mg/dL    BUN 9 6 - 20 mg/dL    Creatinine 0.69 0.57 - 1.00 mg/dL    Sodium 139 136 - 145 mmol/L    Potassium 3.9 3.5 - 5.2 mmol/L    Chloride 106 98 - 107 mmol/L    CO2 23.5 22.0 - 29.0 mmol/L    Calcium 9.4 8.6 - 10.5 mg/dL    Total Protein 7.0 6.0 - 8.5 g/dL    Albumin 4.50 3.50 - 5.20 g/dL    ALT (SGPT) 17 1 - 33 U/L    AST (SGOT) 16 1 - 32 U/L    Alkaline Phosphatase 53 39 - 117 U/L    Total Bilirubin 0.2 0.0 - 1.2 mg/dL    eGFR Non African Amer 106 >60 mL/min/1.73    Globulin 2.5 gm/dL    A/G Ratio 1.8 g/dL    BUN/Creatinine Ratio 13.0 7.0 - 25.0    Anion Gap 9.5 5.0 - 15.0 mmol/L   Lipase    Collection Time: 10/03/21  9:32 PM    Specimen: Blood   Result Value Ref Range    Lipase 42 13 - 60 U/L   hCG, Serum, Qualitative    Collection Time: 10/03/21  9:32 PM    Specimen: Blood   Result Value Ref Range    HCG Qualitative Negative Negative   Magnesium    Collection Time: 10/03/21  9:32 PM    Specimen: Blood   Result Value Ref Range    Magnesium 2.1 1.6 - 2.6 mg/dL   CBC Auto Differential    Collection Time: 10/03/21  9:32 PM    Specimen: Blood   Result Value Ref Range    WBC 5.03 3.40 - 10.80 10*3/mm3    RBC 4.19 3.77 - 5.28 10*6/mm3    Hemoglobin 12.5 12.0 -  15.9 g/dL    Hematocrit 37.5 34.0 - 46.6 %    MCV 89.5 79.0 - 97.0 fL    MCH 29.8 26.6 - 33.0 pg    MCHC 33.3 31.5 - 35.7 g/dL    RDW 12.0 (L) 12.3 - 15.4 %    RDW-SD 39.1 37.0 - 54.0 fl    MPV 9.9 6.0 - 12.0 fL    Platelets 255 140 - 450 10*3/mm3    Neutrophil % 39.9 (L) 42.7 - 76.0 %    Lymphocyte % 52.3 (H) 19.6 - 45.3 %    Monocyte % 5.8 5.0 - 12.0 %    Eosinophil % 1.0 0.3 - 6.2 %    Basophil % 0.8 0.0 - 1.5 %    Immature Grans % 0.2 0.0 - 0.5 %    Neutrophils, Absolute 2.01 1.70 - 7.00 10*3/mm3    Lymphocytes, Absolute 2.63 0.70 - 3.10 10*3/mm3    Monocytes, Absolute 0.29 0.10 - 0.90 10*3/mm3    Eosinophils, Absolute 0.05 0.00 - 0.40 10*3/mm3    Basophils, Absolute 0.04 0.00 - 0.20 10*3/mm3    Immature Grans, Absolute 0.01 0.00 - 0.05 10*3/mm3    nRBC 0.0 0.0 - 0.2 /100 WBC   Urinalysis With Microscopic If Indicated (No Culture) - Urine, Clean Catch    Collection Time: 10/03/21 10:53 PM    Specimen: Urine, Clean Catch   Result Value Ref Range    Color, UA Yellow Yellow, Straw    Appearance, UA Clear Clear    pH, UA 6.0 5.0 - 8.0    Specific Gravity, UA 1.013 1.005 - 1.030    Glucose, UA Negative Negative    Ketones, UA Negative Negative    Bilirubin, UA Negative Negative    Blood, UA Negative Negative    Protein, UA Negative Negative    Leuk Esterase, UA Moderate (2+) (A) Negative    Nitrite, UA Negative Negative    Urobilinogen, UA 1.0 E.U./dL 0.2 - 1.0 E.U./dL   Urinalysis, Microscopic Only - Urine, Clean Catch    Collection Time: 10/03/21 10:53 PM    Specimen: Urine, Clean Catch   Result Value Ref Range    RBC, UA 0-2 None Seen, 0-2 /HPF    WBC, UA 6-12 (A) None Seen, 0-2 /HPF    Bacteria, UA 1+ (A) None Seen /HPF    Squamous Epithelial Cells, UA 3-6 (A) None Seen, 0-2 /HPF    Hyaline Casts, UA 0-2 None Seen /LPF    Methodology Automated Microscopy        Radiology  US Gallbladder    Result Date: 10/3/2021  GALLBLADDER ULTRASOUND  HISTORY: Right upper quadrant pain  COMPARISON: None available.  TECHNIQUE:  Grayscale and color Doppler sonographic images were obtained through the right upper quadrant  FINDINGS: Visualized portions of the pancreas are unremarkable. Liver is homogeneous in echotexture. No focal hepatic lesions are seen. There is no intra or extrahepatic biliary dilatation. Common bile duct measures 3 mm in diameter. No stones or sludge are seen within the gallbladder, and there is no gallbladder wall thickening or pericholecystic fluid. Gallbladder wall measures 2 mm in thickness. Right kidney measures 10.8 x 4.1 x 4.37 cm. There is no hydronephrosis. Renal echotexture is normal.      Normal gallbladder ultrasound.  This report was finalized on 10/3/2021 9:57 PM by Dr. Lupe Rodriguez M.D.        Medical Decision Making:  ED Course as of Oct 03 2350   Sun Oct 03, 2021   2211 WBC: 5.03 [BASHIR]   2211 Hemoglobin: 12.5 [BASHIR]   2211 Platelets: 255 [BASHIR]   2211 Glucose: 87 [BASHIR]   2211 BUN: 9 [BASHIR]   2211 Creatinine: 0.69 [BASHIR]   2211 Sodium: 139 [BASHIR]   2211 Potassium: 3.9 [BASHIR]   2211 ALT (SGPT): 17 [BASHIR]   2211 AST (SGOT): 16 [BASHIR]   2211 Lipase: 42 [BASHIR]   2211 HCG Qualitative: Negative [BASHIR]   2334 Patient rechecked, resting comfortably bed, no acute distress.  Discussed results, diagnosis, treatment plan, and need for further evaluation with surgery.    [BASHIR]      ED Course User Index  [BASHIR] Camilo Alicea PA           PPE: Both the patient and I wore a surgical mask throughout the entire patient encounter. I wore protective goggles.     Diagnosis  Final diagnoses:   RUQ abdominal pain        Caesar Coles II, MD  10/03/21 5550

## 2021-10-04 NOTE — DISCHARGE INSTRUCTIONS
Home, rest, medicine as directed, keep well-hydrated, bland diet, home medicine as prescribed, follow up with PCP for recheck.  Follow-up with surgery for further evaluation with possible outpatient HIDA scan.  Return to care if symptoms worsen or with further concerns.

## 2021-10-06 ENCOUNTER — OFFICE VISIT (OUTPATIENT)
Dept: FAMILY MEDICINE CLINIC | Facility: CLINIC | Age: 21
End: 2021-10-06

## 2021-10-06 VITALS
TEMPERATURE: 96.9 F | OXYGEN SATURATION: 98 % | BODY MASS INDEX: 22.47 KG/M2 | WEIGHT: 139.8 LBS | HEART RATE: 51 BPM | DIASTOLIC BLOOD PRESSURE: 79 MMHG | SYSTOLIC BLOOD PRESSURE: 114 MMHG | HEIGHT: 66 IN

## 2021-10-06 DIAGNOSIS — R10.13 EPIGASTRIC PAIN: Primary | ICD-10-CM

## 2021-10-06 LAB
BILIRUB BLD-MCNC: NEGATIVE MG/DL
CLARITY, POC: CLEAR
COLOR UR: YELLOW
GLUCOSE UR STRIP-MCNC: NEGATIVE MG/DL
KETONES UR QL: NEGATIVE
LEUKOCYTE EST, POC: NEGATIVE
NITRITE UR-MCNC: NEGATIVE MG/ML
PH UR: 7 [PH] (ref 5–8)
PROT UR STRIP-MCNC: NEGATIVE MG/DL
RBC # UR STRIP: NEGATIVE /UL
SP GR UR: 1.02 (ref 1–1.03)
UROBILINOGEN UR QL: NORMAL

## 2021-10-06 PROCEDURE — 99214 OFFICE O/P EST MOD 30 MIN: CPT | Performed by: NURSE PRACTITIONER

## 2021-10-06 NOTE — PROGRESS NOTES
"Chief Complaint  Hospital Follow Up Visit (ER f/u R upper quadrant pain )    Subjective          Aggie Mitchell presents to [facility] PRIMARY [facility]  History of Present Illness patient returns today for right rib pain.  She recently went to ER for worsening right upper quad pain which she had seen me for previously and was awaiting ultrasound.  She had an ultrasound in the emergency room that was normal.  Labs were also grossly normal.  She was given PPI and Zofran for her symptoms.  She was having a little bit of nausea but did not really notice any reflux and never took the Protonix.    This has been ongoing.  She has had pretty extensive evaluation by GI with imaging.  She does have irritable bowel and is having a flare of diarrhea recently.  She does not have any injuries.  Her pain is just at lateral distal right ribs but she feels pressure beneath her ribs especially when she changes positions.  She has not noticed any change in her symptoms with or without food, with or without bowel movement.    She has never been diagnosed with fibromyalgia or other musculoskeletal pain other than she did have an urgent care visit in the past for right costochondritis.  When we discussed this she reports the pain is much different than this pain.  This pain feels like a heaviness in her upper abdomen or lower chest.  She denies difficulty breathing.  She has no fever, chills, chest pain, palpitations.    Aunt with ovarian cancer on moms side  General arthrtitis dads side, some GB, endometriosis.  Objective   Vital Signs:   /79   Pulse 51   Temp 96.9 °F (36.1 °C)   Ht 167.6 cm (66\")   Wt 63.4 kg (139 lb 12.8 oz)   SpO2 98%   BMI 22.56 kg/m²     Physical Exam  Vitals and nursing note reviewed.   Constitutional:       General: She is not in acute distress.     Appearance: She is well-developed. She is not ill-appearing or diaphoretic.   HENT:      Head: Normocephalic and atraumatic.   Eyes:      " General:         Right eye: No discharge.         Left eye: No discharge.      Conjunctiva/sclera: Conjunctivae normal.   Cardiovascular:      Rate and Rhythm: Normal rate and regular rhythm.      Heart sounds: Normal heart sounds.   Pulmonary:      Effort: Pulmonary effort is normal.      Breath sounds: Normal breath sounds.   Abdominal:      General: Bowel sounds are normal. There is no distension.      Palpations: Abdomen is soft. There is no mass.      Tenderness: There is no abdominal tenderness. There is no guarding or rebound.   Musculoskeletal:         General: No deformity.      Comments: She has tenderness to palpation right lateral ribs   Skin:     General: Skin is warm and dry.   Neurological:      Mental Status: She is alert and oriented to person, place, and time.        Result Review :   The following data was reviewed by: FRANCISCO Liang on 10/06/2021:  CMP    CMP 7/6/21 10/3/21   Glucose  87   Glucose 92    BUN 8 9   Creatinine 0.57 0.69   eGFR Non  Am 133 106   eGFR African Am >150    Sodium 140 139   Potassium 4.5 3.9   Chloride 105 106   Calcium 9.6 9.4   Total Protein 7.1    Albumin 4.80 4.50   Globulin 2.3    Total Bilirubin 0.6 0.2   Alkaline Phosphatase 84 53   AST (SGOT) 12 16   ALT (SGPT) 9 17      Comments are available for some flowsheets but are not being displayed.           CBC    CBC 7/6/21 10/3/21   WBC 5.27 5.03   RBC 4.75 4.19   Hemoglobin 14.2 12.5   Hematocrit 42.8 37.5   MCV 90.1 89.5   MCH 29.9 29.8   MCHC 33.2 33.3   RDW 12.1 (A) 12.0 (A)   Platelets 288 255   (A) Abnormal value            Data reviewed: Radiologic studies US RUQ and Recent hospitalization notes recent ER visit          Assessment and Plan    Diagnoses and all orders for this visit:    1. Epigastric pain (Primary)  -     H. Pylori Breath Test - Breath, Lung  -     POCT urinalysis dipstick, automated    Not sure what is causing her symptoms.  Cannot rule out any abdominal or chest etiology.  I am  concerned about the fullness she feels in her abdomen.  Her ultrasound of gallbladder was normal and her symptoms do not sound particularly like gallbladder.  Reviewed ER notes as well as past GI notes and imaging she has had.  Since she has not yet started on a PPI I will get an H. pylori.  I wonder if there is some element of fibromyalgia with tenderness to her ribs.  If H. pylori is negative would get a CT scan of her abdomen.  She does have family history of ovarian cancer although not a first-degree relative.  I have recommended she follow-up with her GI doctor and she will schedule an appointment.  If symptoms worsening she will let me know.  After she gets the H. pylori we will have her start the PPI to see if this is helpful.            Follow Up   No follow-ups on file.  Patient was given instructions and counseling regarding her condition or for health maintenance advice. Please see specific information pulled into the AVS if appropriate.

## 2021-10-07 LAB — UREA BREATH TEST QL: NEGATIVE

## 2021-10-08 DIAGNOSIS — R10.11 RIGHT UPPER QUADRANT PAIN: Primary | ICD-10-CM

## 2021-10-20 ENCOUNTER — PATIENT OUTREACH (OUTPATIENT)
Dept: CASE MANAGEMENT | Facility: OTHER | Age: 21
End: 2021-10-20

## 2021-10-20 NOTE — OUTREACH NOTE
Ambulatory Case Management Note    Care Coordination    Attempted outreach to patient for High Risk Case Management regarding recent ED visit. No successful contact x 4. Consultedt message left with ACM contact info. No further outreach scheduled at this time.       There are no recently modified care plans to display for this patient.      Harriett Valle RN  Ambulatory Case Management    10/20/2021, 14:51 EDT

## 2022-06-17 ENCOUNTER — OFFICE VISIT (OUTPATIENT)
Dept: FAMILY MEDICINE CLINIC | Facility: CLINIC | Age: 22
End: 2022-06-17

## 2022-06-17 VITALS
SYSTOLIC BLOOD PRESSURE: 114 MMHG | DIASTOLIC BLOOD PRESSURE: 77 MMHG | OXYGEN SATURATION: 100 % | HEART RATE: 68 BPM | TEMPERATURE: 98.6 F | BODY MASS INDEX: 22.34 KG/M2 | HEIGHT: 66 IN | WEIGHT: 139 LBS

## 2022-06-17 DIAGNOSIS — G89.29 CHRONIC RIGHT FLANK PAIN: Primary | ICD-10-CM

## 2022-06-17 DIAGNOSIS — R10.9 CHRONIC RIGHT FLANK PAIN: Primary | ICD-10-CM

## 2022-06-17 LAB
BILIRUB BLD-MCNC: NEGATIVE MG/DL
CLARITY, POC: ABNORMAL
COLOR UR: ABNORMAL
EXPIRATION DATE: ABNORMAL
GLUCOSE UR STRIP-MCNC: NEGATIVE MG/DL
KETONES UR QL: NEGATIVE
LEUKOCYTE EST, POC: NEGATIVE
Lab: ABNORMAL
NITRITE UR-MCNC: NEGATIVE MG/ML
PH UR: 7.5 [PH] (ref 5–8)
PROT UR STRIP-MCNC: NEGATIVE MG/DL
RBC # UR STRIP: NEGATIVE /UL
SP GR UR: 1.02 (ref 1–1.03)
UROBILINOGEN UR QL: NORMAL

## 2022-06-17 PROCEDURE — 99214 OFFICE O/P EST MOD 30 MIN: CPT | Performed by: NURSE PRACTITIONER

## 2022-06-17 NOTE — PROGRESS NOTES
"Chief Complaint  Abdominal Pain (F/u epigastric pain not getting better, would like referral to GI) and Urinary Frequency (C/o frequent urination, burning sensation, mild discharge)    Subjective        Aggie Mitchell presents to [facility]  History of Present Illness patient is here to follow-up on continued generalized abdominal pain which is typically right flank.  No relation to food or bowel movements.  She continues to have IBS type symptoms with alternating diarrhea and constipation.  Mostly is having diarrhea currently.  She still has constant intermittent nausea from abdominal pain.    Last week had UTI sx but seem better now-frequency, urethral mucoid discharge.  No vaginal discharge or itching.  No fever, chills.      Objective   Vital Signs:  /77   Pulse 68   Temp 98.6 °F (37 °C)   Ht 167.6 cm (66\")   Wt 63 kg (139 lb)   SpO2 100%   BMI 22.44 kg/m²   Estimated body mass index is 22.44 kg/m² as calculated from the following:    Height as of this encounter: 167.6 cm (66\").    Weight as of this encounter: 63 kg (139 lb).    BMI is within normal parameters. No other follow-up for BMI required.      Physical Exam  Vitals and nursing note reviewed.   Constitutional:       General: She is not in acute distress.     Appearance: She is well-developed. She is not ill-appearing or diaphoretic.   HENT:      Head: Normocephalic and atraumatic.   Eyes:      General:         Right eye: No discharge.         Left eye: No discharge.      Conjunctiva/sclera: Conjunctivae normal.   Cardiovascular:      Rate and Rhythm: Normal rate and regular rhythm.   Pulmonary:      Effort: Pulmonary effort is normal.      Breath sounds: Normal breath sounds.   Abdominal:      General: Bowel sounds are normal. There is no distension.      Palpations: Abdomen is soft. There is no mass.      Tenderness: There is no abdominal tenderness. There is no right CVA tenderness, left CVA tenderness or " guarding.   Musculoskeletal:         General: No deformity.      Comments: Gait smooth and steady   Skin:     General: Skin is warm and dry.   Neurological:      General: No focal deficit present.      Mental Status: She is alert and oriented to person, place, and time.   Psychiatric:         Mood and Affect: Mood normal.         Behavior: Behavior normal.        Result Review :                Assessment and Plan   Diagnoses and all orders for this visit:    1. Chronic right flank pain (Primary)  -     CT Abdomen Pelvis With Contrast; Future  -     Ambulatory Referral to Gastroenterology  -     POCT urinalysis dipstick, automated  -     Urine Culture - Urine, Urine, Clean Catch    IBS  Urine cx-will start abx if needed    She has had chronic ongoing right sided abdominal/flank pain.  Will get CT scan which may or may not be improved by insurance.  I will refer her to gastro for evaluation.  Recommend IBgard probiotics in the meantime.  Aware of signs and symptoms that require emergent evaluation.    UA in office looked okay.  We will have her continue to hydrate over the weekend and if culture on Monday indicates bacterial infection will start antibiotic.  She is currently pretty much asymptomatic.  Patient is agreeable with this plan.               Follow Up   No follow-ups on file.  Patient was given instructions and counseling regarding her condition or for health maintenance advice. Please see specific information pulled into the AVS if appropriate.

## 2022-06-19 LAB
BACTERIA UR CULT: NO GROWTH
BACTERIA UR CULT: NORMAL

## 2022-07-11 ENCOUNTER — HOSPITAL ENCOUNTER (OUTPATIENT)
Dept: CT IMAGING | Facility: HOSPITAL | Age: 22
Discharge: HOME | End: 2022-07-11
Admitting: NURSE PRACTITIONER

## 2022-07-11 DIAGNOSIS — G89.29 CHRONIC RIGHT FLANK PAIN: ICD-10-CM

## 2022-07-11 DIAGNOSIS — R10.9 CHRONIC RIGHT FLANK PAIN: ICD-10-CM

## 2022-07-11 PROCEDURE — 25010000002 IOPAMIDOL 61 % SOLUTION: Performed by: NURSE PRACTITIONER

## 2022-07-11 PROCEDURE — 74177 CT ABD & PELVIS W/CONTRAST: CPT

## 2022-07-11 RX ADMIN — IOPAMIDOL 85 ML: 612 INJECTION, SOLUTION INTRAVENOUS at 10:43

## 2022-08-02 RX ORDER — DROSPIRENONE AND ETHINYL ESTRADIOL 0.02-3(28)
1 KIT ORAL DAILY
Qty: 28 TABLET | Refills: 11 | OUTPATIENT
Start: 2022-08-02 | End: 2023-08-02

## 2022-08-02 RX ORDER — DROSPIRENONE AND ETHINYL ESTRADIOL 0.02-3(28)
1 KIT ORAL DAILY
Qty: 28 TABLET | Refills: 0 | Status: SHIPPED | OUTPATIENT
Start: 2022-08-02 | End: 2022-08-13 | Stop reason: SDUPTHER

## 2022-08-15 RX ORDER — DROSPIRENONE AND ETHINYL ESTRADIOL 0.02-3(28)
1 KIT ORAL DAILY
Qty: 84 TABLET | Refills: 0 | Status: SHIPPED | OUTPATIENT
Start: 2022-08-15 | End: 2022-11-15

## 2022-08-31 ENCOUNTER — TELEPHONE (OUTPATIENT)
Dept: FAMILY MEDICINE CLINIC | Facility: CLINIC | Age: 22
End: 2022-08-31

## 2022-08-31 ENCOUNTER — OFFICE VISIT (OUTPATIENT)
Dept: FAMILY MEDICINE CLINIC | Facility: CLINIC | Age: 22
End: 2022-08-31

## 2022-08-31 VITALS
HEIGHT: 66 IN | BODY MASS INDEX: 22.31 KG/M2 | SYSTOLIC BLOOD PRESSURE: 118 MMHG | RESPIRATION RATE: 12 BRPM | DIASTOLIC BLOOD PRESSURE: 68 MMHG | OXYGEN SATURATION: 98 % | HEART RATE: 102 BPM | WEIGHT: 138.8 LBS | TEMPERATURE: 96.6 F

## 2022-08-31 DIAGNOSIS — G47.09 OTHER INSOMNIA: Primary | ICD-10-CM

## 2022-08-31 PROCEDURE — 99213 OFFICE O/P EST LOW 20 MIN: CPT | Performed by: NURSE PRACTITIONER

## 2022-08-31 RX ORDER — TRAZODONE HYDROCHLORIDE 50 MG/1
TABLET ORAL
Qty: 30 TABLET | Refills: 2 | Status: SHIPPED | OUTPATIENT
Start: 2022-08-31 | End: 2022-10-12

## 2022-08-31 NOTE — TELEPHONE ENCOUNTER
"  Caller: Aggie Mitchell    Relationship to patient: Self    Best call back number: [phone]    Patient is needing:  CONCERNING SLEEPING ISSUES. PATIENT STATES IT IS AT THE \"CRITICAL STAGE\"   PLEASE ADVISE           "

## 2022-09-29 ENCOUNTER — OFFICE VISIT (OUTPATIENT)
Dept: GASTROENTEROLOGY | Facility: CLINIC | Age: 22
End: 2022-09-29

## 2022-09-29 VITALS
HEIGHT: 66 IN | OXYGEN SATURATION: 100 % | WEIGHT: 144.2 LBS | TEMPERATURE: 96.8 F | HEART RATE: 85 BPM | SYSTOLIC BLOOD PRESSURE: 120 MMHG | BODY MASS INDEX: 23.18 KG/M2 | DIASTOLIC BLOOD PRESSURE: 64 MMHG

## 2022-09-29 DIAGNOSIS — R93.5 ABNORMAL CT OF THE ABDOMEN: ICD-10-CM

## 2022-09-29 DIAGNOSIS — R19.7 DIARRHEA, UNSPECIFIED TYPE: Primary | ICD-10-CM

## 2022-09-29 DIAGNOSIS — N81.6: ICD-10-CM

## 2022-09-29 DIAGNOSIS — K58.0 IRRITABLE BOWEL SYNDROME WITH DIARRHEA: ICD-10-CM

## 2022-09-29 DIAGNOSIS — R10.9 RIGHT FLANK PAIN: ICD-10-CM

## 2022-09-29 PROCEDURE — 99244 OFF/OP CNSLTJ NEW/EST MOD 40: CPT | Performed by: INTERNAL MEDICINE

## 2022-09-29 NOTE — PROGRESS NOTES
"Chief Complaint   Patient presents with   • GI Problem   right flank pain        History of Present Illness  Patient is a 21 year old female who presents today for evaluation.    Patient presents today with concerns about GI symptoms that are worsening. She reports she has diarrhea chronically. This leads to dehydration. She reports nausea and some vomiting that can occur when she is dehydrated.    She reports stool is always diarrheal. She denies any blood in the stool. Reports weight is stable. Symptoms have been present for at least 1 year.    She reports intermittent abdominal pain.    She does not feel stress contributes to diarrhea.    She reports she had an EGD before the pandemic and was tested for Celiac disease which was negative. She has not had a colonoscopy.    Review of Systems     Result Review :       Chronic right flank pain  CT Abdomen Pelvis With Contrast (07/11/2022 10:52)  enteritis  US Gallbladder (10/03/2021 21:42)  MRI Pelvis With Defecation (11/14/2019 09:17)  prerineal syndrome and rectocoele  H. Pylori Breath Test - Breath, Lung (10/06/2021 10:02)  Lipase (10/03/2021 21:32)  Comprehensive Metabolic Panel (10/03/2021 21:32)  CBC & Differential (10/03/2021 21:32)    Vital Signs:   /64   Pulse 85   Temp 96.8 °F (36 °C)   Ht 167.6 cm (66\")   Wt 65.4 kg (144 lb 3.2 oz)   SpO2 100%   BMI 23.27 kg/m²     Body mass index is 23.27 kg/m².     Physical Exam  Vitals reviewed.   Constitutional:       Appearance: Normal appearance.   HENT:      Nose: No nasal deformity.   Eyes:      General: No scleral icterus.  Neck:      Comments: Trachea midline.  Cardiovascular:      Rate and Rhythm: Normal rate and regular rhythm.   Pulmonary:      Effort: No respiratory distress.      Breath sounds: Normal breath sounds.   Abdominal:      General: Bowel sounds are normal. There is no distension.      Palpations: Abdomen is soft. There is no mass.      Tenderness: There is no abdominal tenderness. "   Lymphadenopathy:      Comments: No periumbilical lymphadenopathy.   Skin:     General: Skin is warm.   Neurological:      Mental Status: She is alert.           Assessment and Plan    Diagnoses and all orders for this visit:    1. Diarrhea, unspecified type (Primary)    2. Right flank pain    3. Abnormal CT of the abdomen  Comments:  enteritis    4. Rectoceles    5. Irritable bowel syndrome with diarrhea    Other orders  -     riFAXIMin (Xifaxan) 550 MG tablet; Take 1 tablet by mouth 3 (Three) Times a Day for 14 days.  Dispense: 42 tablet; Refill: 0       Plan to treat for enteritis. If symptoms do not improve, will plan to schedule EGD and colonoscopy.      I have reviewed and confirmed the accuracy of the HPI and Assessment and Plan as documented by the APRN FRANCISCO Aguayo        Follow Up   Return if symptoms worsen or fail to improve.    Patient Instructions   Complete treatment with Xifaxan as prescribed.    Please notify the office with an update after you complete treatment with Xifaxan.        Documentation by Freya SINGH acting as a scribe in the following sections on behalf of the billable provider: HPI, ROS, assessment, & plan.

## 2022-09-29 NOTE — PATIENT INSTRUCTIONS
Complete treatment with Xifaxan as prescribed.    Please notify the office with an update after you complete treatment with Xifaxan.

## 2022-10-11 DIAGNOSIS — G47.09 OTHER INSOMNIA: ICD-10-CM

## 2022-10-11 NOTE — TELEPHONE ENCOUNTER
Rx Refill Note  Requested Prescriptions     Pending Prescriptions Disp Refills   • traZODone (DESYREL) 50 MG tablet [Pharmacy Med Name: TRAZODONE 50MG TABLETS] 30 tablet 2     Sig: TAKE 1 TO 2 TABLETS BY MOUTH DAILY AT BEDTIME AS NEEDED FOR INSOMNIA      Last office visit with prescribing clinician: 8/31/2022      Next office visit with prescribing clinician: Visit date not found            Alma Lal Rep  10/11/22, 09:45 EDT

## 2022-10-12 RX ORDER — TRAZODONE HYDROCHLORIDE 50 MG/1
TABLET ORAL
Qty: 30 TABLET | Refills: 2 | Status: SHIPPED | OUTPATIENT
Start: 2022-10-12 | End: 2023-03-20

## 2022-11-01 ENCOUNTER — OFFICE VISIT (OUTPATIENT)
Dept: GASTROENTEROLOGY | Facility: CLINIC | Age: 22
End: 2022-11-01

## 2022-11-01 ENCOUNTER — PREP FOR SURGERY (OUTPATIENT)
Dept: SURGERY | Facility: SURGERY CENTER | Age: 22
End: 2022-11-01

## 2022-11-01 ENCOUNTER — TELEPHONE (OUTPATIENT)
Dept: GASTROENTEROLOGY | Facility: CLINIC | Age: 22
End: 2022-11-01

## 2022-11-01 DIAGNOSIS — R19.7 DIARRHEA, UNSPECIFIED TYPE: Primary | ICD-10-CM

## 2022-11-01 DIAGNOSIS — R11.2 NAUSEA AND VOMITING, UNSPECIFIED VOMITING TYPE: ICD-10-CM

## 2022-11-01 DIAGNOSIS — R10.11 RIGHT UPPER QUADRANT ABDOMINAL PAIN: ICD-10-CM

## 2022-11-01 DIAGNOSIS — R10.32 LEFT LOWER QUADRANT ABDOMINAL PAIN: ICD-10-CM

## 2022-11-01 DIAGNOSIS — R93.5 ABNORMAL CT OF THE ABDOMEN: ICD-10-CM

## 2022-11-01 PROCEDURE — 99442 PR PHYS/QHP TELEPHONE EVALUATION 11-20 MIN: CPT | Performed by: NURSE PRACTITIONER

## 2022-11-01 RX ORDER — DICYCLOMINE HYDROCHLORIDE 10 MG/1
10 CAPSULE ORAL 3 TIMES DAILY PRN
Qty: 90 CAPSULE | Refills: 5 | Status: SHIPPED | OUTPATIENT
Start: 2022-11-01

## 2022-11-01 RX ORDER — SODIUM CHLORIDE 0.9 % (FLUSH) 0.9 %
10 SYRINGE (ML) INJECTION AS NEEDED
Status: CANCELLED | OUTPATIENT
Start: 2022-11-01

## 2022-11-01 RX ORDER — SODIUM CHLORIDE 0.9 % (FLUSH) 0.9 %
3 SYRINGE (ML) INJECTION EVERY 12 HOURS SCHEDULED
Status: CANCELLED | OUTPATIENT
Start: 2022-11-01

## 2022-11-01 RX ORDER — SODIUM CHLORIDE, SODIUM LACTATE, POTASSIUM CHLORIDE, CALCIUM CHLORIDE 600; 310; 30; 20 MG/100ML; MG/100ML; MG/100ML; MG/100ML
30 INJECTION, SOLUTION INTRAVENOUS CONTINUOUS PRN
Status: CANCELLED | OUTPATIENT
Start: 2022-11-01

## 2022-11-01 NOTE — TELEPHONE ENCOUNTER
----- Message from FRANCISCO Aguayo sent at 11/1/2022  7:46 AM EDT -----  Regarding: FW: Follow-up appointment?  Contact: [phone]        ----- Message -----  From: Sophia Blevins APRN  Sent: 11/1/2022   7:33 AM EDT  To: FRANCISCO Aguayo  Subject: FW: Follow-up appointment?                         ----- Message -----  From: Aggie Mitchell  Sent: 10/31/2022   7:20 PM EDT  To: [facility]  Subject: Follow-up appointment?                           I was called about scheduling an appointment and they didn’t have any until after the first so she was going to talk spencer Pulido to see what she wanted to do. I haven’t heard back—this was last Thursday. Just checking up.

## 2022-11-01 NOTE — PROGRESS NOTES
Chief Complaint   Patient presents with   • GI Problem         History of Present Illness  Patient is a 21-year-old female who presents today for follow-up.  She was seen in the office September 2022 for worsening diarrhea, nausea, and vomiting.  Previous CT scan had shown enteritis.  She was treated with Xifaxan.    Patient presents today for follow-up.  She completed treatment with Xifaxan but has seen no change in symptoms with treatment.    She continues to have symptoms on a daily basis.  She reports diarrhea with around 5 bowel movements per day on average.  She at times has mucus in the stools but denies any blood in the stool.    She feels she becomes easily dehydrated because of the diarrhea which causes nausea and at times vomiting.    She has had some intermittent abdominal pain.  Pain is generally located to the right upper quadrant or left lower quadrant.    She reports stable weight.    She has also been experiencing joint pain, reports this affects multiple joints and is relatively generalized.    You have chosen to receive care through a telephone visit.   Do you consent to use a telephone visit for your medical care today? Yes       Result Review :      CT Abdomen Pelvis With Contrast (07/11/2022 10:52)   Office Visit with Ayo Correa MD (09/29/2022)   MRI Pelvis With Defecation (11/14/2019 09:17)  US Gallbladder (10/03/2021 21:42)      Physical Exam - TELEPHONE VISIT      Assessment and Plan    Diagnoses and all orders for this visit:    1. Diarrhea, unspecified type (Primary)    2. Abnormal CT of the abdomen    3. Nausea and vomiting, unspecified vomiting type    4. Right upper quadrant abdominal pain    5. Left lower quadrant abdominal pain    Other orders  -     dicyclomine (BENTYL) 10 MG capsule; Take 1 capsule by mouth 3 (Three) Times a Day As Needed (abdominal pain/diarrhea).  Dispense: 90 capsule; Refill: 5         Discussion  Patient presents today for follow-up with concerns about  ongoing symptoms.  No improvement in symptoms with treatment with Xifaxan for enteritis.  Recommended EGD and colonoscopy for further evaluation and to assess for any inflammatory bowel disease in light of her symptoms and CT scan showing enteritis.  We will provide prescription for dicyclomine to be used as needed for symptoms pending endoscopic evaluation.  If endoscopic evaluation normal, to consider MRI enterography for further evaluation.      This visit has been rescheduled as a phone visit to comply with patient safety concerns in accordance with CDC recommendations. Total time of discussion was 11 minutes.    Follow Up   Return for Follow up to review results after testing complete.    Patient Instructions   Schedule EGD and colonoscopy for further evaluation.     For abdominal cramping/diarrhea, begin trial of dicyclomine as prescribed. Prescription sent to pharmacy.

## 2022-11-01 NOTE — PATIENT INSTRUCTIONS
Schedule EGD and colonoscopy for further evaluation.     For abdominal cramping/diarrhea, begin trial of dicyclomine as prescribed. Prescription sent to pharmacy.

## 2022-11-15 RX ORDER — DROSPIRENONE AND ETHINYL ESTRADIOL 0.02-3(28)
1 KIT ORAL DAILY
Qty: 84 TABLET | Refills: 0 | Status: SHIPPED | OUTPATIENT
Start: 2022-11-15 | End: 2022-12-27 | Stop reason: SDUPTHER

## 2022-12-08 ENCOUNTER — TELEPHONE (OUTPATIENT)
Dept: GASTROENTEROLOGY | Facility: CLINIC | Age: 22
End: 2022-12-08

## 2022-12-21 ENCOUNTER — ANESTHESIA EVENT (OUTPATIENT)
Dept: SURGERY | Facility: SURGERY CENTER | Age: 22
End: 2022-12-21

## 2022-12-21 ENCOUNTER — HOSPITAL ENCOUNTER (OUTPATIENT)
Facility: SURGERY CENTER | Age: 22
Setting detail: HOSPITAL OUTPATIENT SURGERY
Discharge: HOME | End: 2022-12-21
Attending: SURGERY | Admitting: SURGERY

## 2022-12-21 ENCOUNTER — HOSPITAL ENCOUNTER (OUTPATIENT)
Facility: SURGERY CENTER | Age: 22
Setting detail: HOSPITAL OUTPATIENT SURGERY
Discharge: HOME | End: 2022-12-21
Attending: INTERNAL MEDICINE | Admitting: INTERNAL MEDICINE

## 2022-12-21 ENCOUNTER — ANESTHESIA (OUTPATIENT)
Dept: SURGERY | Facility: SURGERY CENTER | Age: 22
End: 2022-12-21

## 2022-12-21 VITALS
OXYGEN SATURATION: 99 % | HEART RATE: 79 BPM | DIASTOLIC BLOOD PRESSURE: 77 MMHG | TEMPERATURE: 97.7 F | HEIGHT: 66 IN | BODY MASS INDEX: 21.86 KG/M2 | WEIGHT: 136 LBS | SYSTOLIC BLOOD PRESSURE: 111 MMHG | RESPIRATION RATE: 16 BRPM

## 2022-12-21 DIAGNOSIS — R11.2 NAUSEA WITH VOMITING: ICD-10-CM

## 2022-12-21 DIAGNOSIS — R11.2 NAUSEA AND VOMITING, UNSPECIFIED VOMITING TYPE: ICD-10-CM

## 2022-12-21 DIAGNOSIS — R93.5 ABNORMAL CT OF THE ABDOMEN: ICD-10-CM

## 2022-12-21 DIAGNOSIS — R19.7 DIARRHEA, UNSPECIFIED TYPE: ICD-10-CM

## 2022-12-21 DIAGNOSIS — R10.32 LEFT LOWER QUADRANT ABDOMINAL PAIN: ICD-10-CM

## 2022-12-21 DIAGNOSIS — R10.11 RIGHT UPPER QUADRANT ABDOMINAL PAIN: ICD-10-CM

## 2022-12-21 DIAGNOSIS — R19.7 DIARRHEA: ICD-10-CM

## 2022-12-21 LAB
B-HCG UR QL: NEGATIVE
EXPIRATION DATE: NORMAL
INTERNAL NEGATIVE CONTROL: NEGATIVE
INTERNAL POSITIVE CONTROL: POSITIVE
Lab: NORMAL

## 2022-12-21 PROCEDURE — 43239 EGD BIOPSY SINGLE/MULTIPLE: CPT | Performed by: SURGERY

## 2022-12-21 PROCEDURE — 81025 URINE PREGNANCY TEST: CPT | Performed by: SURGERY

## 2022-12-21 PROCEDURE — 87081 CULTURE SCREEN ONLY: CPT | Performed by: SURGERY

## 2022-12-21 PROCEDURE — 88305 TISSUE EXAM BY PATHOLOGIST: CPT | Performed by: SURGERY

## 2022-12-21 PROCEDURE — 25010000002 PROPOFOL 10 MG/ML EMULSION: Performed by: ANESTHESIOLOGY

## 2022-12-21 PROCEDURE — 45380 COLONOSCOPY AND BIOPSY: CPT | Performed by: SURGERY

## 2022-12-21 PROCEDURE — 0 LIDOCAINE 1 % SOLUTION: Performed by: ANESTHESIOLOGY

## 2022-12-21 RX ORDER — SODIUM CHLORIDE, SODIUM LACTATE, POTASSIUM CHLORIDE, CALCIUM CHLORIDE 600; 310; 30; 20 MG/100ML; MG/100ML; MG/100ML; MG/100ML
1000 INJECTION, SOLUTION INTRAVENOUS CONTINUOUS
Status: DISCONTINUED | OUTPATIENT
Start: 2022-12-21 | End: 2022-12-21 | Stop reason: HOSPADM

## 2022-12-21 RX ORDER — LIDOCAINE HYDROCHLORIDE 10 MG/ML
0.5 INJECTION, SOLUTION INFILTRATION; PERINEURAL ONCE AS NEEDED
Status: DISCONTINUED | OUTPATIENT
Start: 2022-12-21 | End: 2022-12-21 | Stop reason: HOSPADM

## 2022-12-21 RX ORDER — LIDOCAINE HYDROCHLORIDE 10 MG/ML
INJECTION, SOLUTION INFILTRATION; PERINEURAL AS NEEDED
Status: DISCONTINUED | OUTPATIENT
Start: 2022-12-21 | End: 2022-12-21 | Stop reason: SURG

## 2022-12-21 RX ORDER — SODIUM CHLORIDE 0.9 % (FLUSH) 0.9 %
10 SYRINGE (ML) INJECTION AS NEEDED
Status: DISCONTINUED | OUTPATIENT
Start: 2022-12-21 | End: 2022-12-21 | Stop reason: HOSPADM

## 2022-12-21 RX ORDER — SODIUM CHLORIDE 0.9 % (FLUSH) 0.9 %
3 SYRINGE (ML) INJECTION EVERY 12 HOURS SCHEDULED
Status: DISCONTINUED | OUTPATIENT
Start: 2022-12-21 | End: 2022-12-21 | Stop reason: HOSPADM

## 2022-12-21 RX ORDER — OMEPRAZOLE 40 MG/1
40 CAPSULE, DELAYED RELEASE ORAL DAILY
Qty: 30 CAPSULE | Refills: 6 | Status: SHIPPED | OUTPATIENT
Start: 2022-12-21

## 2022-12-21 RX ORDER — PROPOFOL 10 MG/ML
VIAL (ML) INTRAVENOUS AS NEEDED
Status: DISCONTINUED | OUTPATIENT
Start: 2022-12-21 | End: 2022-12-21 | Stop reason: SURG

## 2022-12-21 RX ADMIN — PROPOFOL 140 MCG/KG/MIN: 10 INJECTION, EMULSION INTRAVENOUS at 08:54

## 2022-12-21 RX ADMIN — PROPOFOL 30 MG: 10 INJECTION, EMULSION INTRAVENOUS at 08:58

## 2022-12-21 RX ADMIN — PROPOFOL 100 MG: 10 INJECTION, EMULSION INTRAVENOUS at 08:54

## 2022-12-21 RX ADMIN — SODIUM CHLORIDE, POTASSIUM CHLORIDE, SODIUM LACTATE AND CALCIUM CHLORIDE 1000 ML: 600; 310; 30; 20 INJECTION, SOLUTION INTRAVENOUS at 07:59

## 2022-12-21 RX ADMIN — PROPOFOL 30 MG: 10 INJECTION, EMULSION INTRAVENOUS at 08:56

## 2022-12-21 RX ADMIN — LIDOCAINE HYDROCHLORIDE 30 MG: 10 INJECTION, SOLUTION INFILTRATION; PERINEURAL at 08:54

## 2022-12-21 NOTE — H&P
"General Surgery      Patient Care Team:  Marion Jaquez APRN as PCP - General (Nurse Practitioner)  Ayo Correa MD as Consulting Physician (Gastroenterology)    CHIEF COMPLAINT: Nausea with vomiting and diarrhea    HISTORY OF PRESENT ILLNESS:    This very pleasant 22-year-old female has been having issues with nausea, vomiting, diarrhea.  She had a gallbladder ultrasound which was negative.  She had a CT scan that showed possibility of having enteritis.  She was scheduled to have a EGD and colonoscopy by Dr. Correa but he is out on medical leave and I am stepping in to do these procedures.      Past Medical History:   Diagnosis Date   • IBS (irritable bowel syndrome)      Past Surgical History:   Procedure Laterality Date   • TEAR DUCT SURGERY Bilateral    • UPPER GASTROINTESTINAL ENDOSCOPY     • WISDOM TOOTH EXTRACTION       Family History   Problem Relation Age of Onset   • Irritable bowel syndrome Paternal Aunt    • Colon polyps Paternal Uncle    • Colon cancer Paternal Grandmother    • Crohn's disease Neg Hx    • Ulcerative colitis Neg Hx      Social History     Tobacco Use   • Smoking status: Former   • Smokeless tobacco: Never   Vaping Use   • Vaping Use: Never used   Substance Use Topics   • Alcohol use: Never   • Drug use: Never     No medications prior to admission.     Allergies:  Patient has no known allergies.    REVIEW OF SYSTEMS:  Please see the above history of present illness for pertinent positives and negatives.  The remainder of the patient's systems have been reviewed and are negative.     Vital Signs  Temp:  [98 °F (36.7 °C)] 98 °F (36.7 °C)  Heart Rate:  [103] 103  Resp:  [18] 18  BP: (137)/(84) 137/84    Flowsheet Rows    Flowsheet Row First Filed Value   Admission Height 167.6 cm (66\") Documented at 12/21/2022 0741   Admission Weight 61.7 kg (136 lb) Documented at 12/21/2022 0743           Physical Exam:  Physical Exam   Constitutional: Patient appears well-developed and " well-nourished and in no acute distress   HEENT:   Head: Normocephalic and atraumatic.   Eyes:  Pupils are equal, round, and reactive to light.  Mouth and Throat: Patient has moist mucous membranes. Oropharynx is clear of any erythema or exudate.     Musculoskeletal: Normal posture.  Extremities: No edema. No deformities noted  Neurological: Patient is alert and oriented.  Psychological:   Mood and behavior appropriate.  Skin: Skin is warm and dry.    Debilities/Disabilities Identified: None    Emotional Behavior: Appropriate           Results Review:    I reviewed the patient's new clinical results.  Lab Results (most recent)     Procedure Component Value Units Date/Time    POC Urine Pregnancy [[id number]] Collected: 12/21/22 0750    Specimen: Urine Updated: 12/21/22 0751     HCG, Urine, QL Negative     Lot Number [id number]     Internal Positive Control Positive     Internal Negative Control Negative     Expiration Date 12-31-23          Imaging Results (Most Recent)     None        reviewed    ECG/EMG Results (most recent)     None            Assessment & Plan     Nausea, vomiting, diarrhea  I discussed with the patient the benefits and risks of performing endoscopy.  Benefits and risks were not limited to but including bleeding, infection, perforation, complications of anesthesia, aspiration.  The patient appeared to understand and is willing to proceed.    Thank you for allowing me to participate in the care of this interesting patient.        Shereen Bone DO  12/21/22  08:48 EST

## 2022-12-21 NOTE — OP NOTE
EGD and Colonoscopy Procedure Note      Pre-operative Diagnosis: Nausea, vomiting, diarrhea    Post-operative Diagnosis: Gastritis, duodenitis, rectal polyp    Procedure:  EGD with biopsies with cold forceps and  Colonoscopy with biopsy of terminal ileum and polypectomy using cold forceps    Surgeon: Lizandro    Anesthetic: Ramos Conte MD    Estimated Blood Loss: Minimal    Complications: None    Indications: See preoperative diagnosis    Findings/Treatments:   Gastritis-biopsy for H. pylori with cold forceps  Duodenitis-biopsies of mucosa with cold forceps  Diarrhea-biopsy of terminal ileum with cold forceps  Rectal polyp-removed cold forcep       Scope Withdrawal Time:  6 minutes      Recommendations: Await pathology      Procedure Details     After discussing the benefits and risks of an EGD and Colonoscopy, benefits and risks not limited to but including:  Bleeding, infection, perforation, aspiration; informed consent was signed.  The patient was taken into the endoscopy suite at [facility] and placed in the left lateral decubitus position.  MAC anesthesia was induced under appropriate monitoring.  Bite block was placed and the gastroscope was inserted thru such and advanced under direct vision to second portion of the duodenum.  A careful inspection was made as the gastroscope was withdrawn, including a retroflexed view of the proximal stomach; findings and interventions are described below.  If biopsies were taken, this was done with the cold biopsy forceps.    The bed was then rotated 180 degrees.  A rectal exam was performed.  Sphincter tone was normal.  The colonoscope was then inserted and carefully advanced to the cecum while visualizing the mucosa.  The cecum was identified by the ileocecal valve and the orifice of the appendix.  Once in the cecum the terminal ileum was intubated and appeared normal but a biopsy was obtained with cold forceps due to her history of  diarrhea and possibility of having enteritis.  The scope was then slowly withdrawn while carefully evaluating mucosa and deflating air.  Once in the rectum the scope was retroflexed revealing a tiny polyp that was removed cold forceps the scope was then removed and Aggie was taken to the recovery area in stable postoperative condition having tolerated her procedure well.    Shereen Bone DO

## 2022-12-21 NOTE — ANESTHESIA POSTPROCEDURE EVALUATION
"Patient: Aggie Mitchell    Procedure Summary     Date: 12/21/22 Room / Location: [facility] / [facility]    Anesthesia Start: 0850 Anesthesia Stop: 0922    Procedures:       ESOPHAGOGASTRODUODENOSCOPY      COLONOSCOPY FOR SCREENING with Polypectomy Diagnosis:       Diarrhea      Nausea with vomiting      (Diarrhea [R19.7])      (Nausea with vomiting [R11.2])    Surgeons: Shereen Bone DO Provider: Ramos Pelayo MD    Anesthesia Type: MAC ASA Status: 2          Anesthesia Type: MAC    Vitals  Vitals Value Taken Time   /64 12/21/22 0920   Temp 36.5 °C (97.7 °F) 12/21/22 0920   Pulse 81 12/21/22 0925   Resp 16 12/21/22 0925   SpO2 99 % 12/21/22 0925           Post Anesthesia Care and Evaluation    Patient location during evaluation: bedside  Patient participation: complete - patient participated  Level of consciousness: awake and alert  Pain management: adequate    Airway patency: patent  Anesthetic complications: No anesthetic complications  PONV Status: controlled  Cardiovascular status: acceptable  Respiratory status: acceptable  Hydration status: acceptable    Comments: /64 (BP Location: Left arm, Patient Position: Lying)   Pulse 81   Temp 36.5 °C (97.7 °F) (Infrared)   Resp 16   Ht 167.6 cm (66\")   Wt 61.7 kg (136 lb)   LMP 11/01/2022   SpO2 99%   BMI 21.95 kg/m²         "

## 2022-12-21 NOTE — ANESTHESIA PREPROCEDURE EVALUATION
Anesthesia Evaluation     Patient summary reviewed and Nursing notes reviewed   NPO Solid Status: > 8 hours  NPO Liquid Status: > 2 hours           Airway   Mallampati: II  TM distance: >3 FB  Neck ROM: full  No difficulty expected  Dental - normal exam     Pulmonary - negative pulmonary ROS and normal exam   Cardiovascular - negative cardio ROS and normal exam  Exercise tolerance: good (4-7 METS)        Neuro/Psych- negative ROS  GI/Hepatic/Renal/Endo - negative ROS     Musculoskeletal (-) negative ROS    Abdominal    Substance History - negative use     OB/GYN negative ob/gyn ROS         Other                        Anesthesia Plan    ASA 2     MAC     intravenous induction     Anesthetic plan, risks, benefits, and alternatives have been provided, discussed and informed consent has been obtained with: patient.        CODE STATUS:

## 2022-12-22 ENCOUNTER — TELEPHONE (OUTPATIENT)
Dept: SURGERY | Facility: CLINIC | Age: 22
End: 2022-12-22

## 2022-12-22 LAB
LAB AP CASE REPORT: NORMAL
PATH REPORT.FINAL DX SPEC: NORMAL
PATH REPORT.GROSS SPEC: NORMAL
UREASE TISS QL: NEGATIVE

## 2022-12-22 NOTE — TELEPHONE ENCOUNTER
12/22/2022 Pt ret call.  THe following message given to pt: EGD =  Gastritis/duodenitis.  Omeprazole 40 mg caps Rx'd.  Pt to take 1 cap daily.  Pt to report how she if feeling in 1 month.      12/21/2022 - Per the direction of   Dr. Bone phone call to pt to discuss EGD/C-SCOPE findings.  NA/VM.

## 2022-12-27 ENCOUNTER — OFFICE VISIT (OUTPATIENT)
Dept: OBSTETRICS AND GYNECOLOGY | Facility: CLINIC | Age: 22
End: 2022-12-27

## 2022-12-27 VITALS
WEIGHT: 140.6 LBS | SYSTOLIC BLOOD PRESSURE: 110 MMHG | HEIGHT: 66 IN | DIASTOLIC BLOOD PRESSURE: 78 MMHG | BODY MASS INDEX: 22.6 KG/M2

## 2022-12-27 DIAGNOSIS — Z01.419 PAP SMEAR, LOW-RISK: ICD-10-CM

## 2022-12-27 DIAGNOSIS — Z01.419 ENCOUNTER FOR GYNECOLOGICAL EXAMINATION: Primary | ICD-10-CM

## 2022-12-27 LAB
B-HCG UR QL: NEGATIVE
BILIRUB BLD-MCNC: NEGATIVE MG/DL
CLARITY, POC: CLEAR
COLOR UR: YELLOW
EXPIRATION DATE: NORMAL
GLUCOSE UR STRIP-MCNC: NEGATIVE MG/DL
INTERNAL NEGATIVE CONTROL: NORMAL
INTERNAL POSITIVE CONTROL: NORMAL
KETONES UR QL: NEGATIVE
LEUKOCYTE EST, POC: NEGATIVE
Lab: NORMAL
NITRITE UR-MCNC: NEGATIVE MG/ML
PH UR: 5 [PH] (ref 5–8)
PROT UR STRIP-MCNC: NEGATIVE MG/DL
RBC # UR STRIP: NEGATIVE /UL
SP GR UR: 1 (ref 1–1.03)
UROBILINOGEN UR QL: NORMAL

## 2022-12-27 PROCEDURE — 3008F BODY MASS INDEX DOCD: CPT | Performed by: OBSTETRICS & GYNECOLOGY

## 2022-12-27 PROCEDURE — 81025 URINE PREGNANCY TEST: CPT | Performed by: OBSTETRICS & GYNECOLOGY

## 2022-12-27 PROCEDURE — 99395 PREV VISIT EST AGE 18-39: CPT | Performed by: OBSTETRICS & GYNECOLOGY

## 2022-12-27 PROCEDURE — 81002 URINALYSIS NONAUTO W/O SCOPE: CPT | Performed by: OBSTETRICS & GYNECOLOGY

## 2022-12-27 PROCEDURE — 2014F MENTAL STATUS ASSESS: CPT | Performed by: OBSTETRICS & GYNECOLOGY

## 2022-12-27 RX ORDER — DROSPIRENONE AND ETHINYL ESTRADIOL 0.02-3(28)
1 KIT ORAL DAILY
Qty: 84 TABLET | Refills: 3 | Status: SHIPPED | OUTPATIENT
Start: 2022-12-27 | End: 2023-12-27

## 2022-12-27 NOTE — PROGRESS NOTES
GYN Annual Exam     CC- Here for annual exam.     Aggie Mitchell is a 22 y.o. female who presents for annual well woman exam. Periods are regular every 28-30 days, lasting 3 days. Dysmenorrhea:none. Pt reports her cycles are lighter on the pill.  No intermenstrual bleeding, spotting, or discharge.  Patient is sexually active  yes - same sex relationship. She was started on junel Fe 1/20 for PMS sx and began to experience anxiety and have a panic attack on meds and stopped after 1 week. Pt changed to Kate and reporting improved sx. Pt desires to continue.     OB History    No obstetric history on file.         Current contraception: none  History of abnormal Pap smear: no  History of abnormal mammogram: no  Family history of uterine, colon or ovarian cancer: yes - Paternal GM. Maternal Aunt with ovarian cancer- unsure about BRCa status  Family history of breast cancer: no    Health Maintenance   Topic Date Due   • TDAP/TD VACCINES (2 - Td or Tdap) 03/09/2019   • HEPATITIS C SCREENING  Never done   • PAP SMEAR  Never done   • COVID-19 Vaccine (4 - Booster for Pfizer series) 02/04/2022   • CHLAMYDIA SCREENING  03/23/2022   • Annual Gynecologic Pelvic and Breast Exam  03/24/2022   • ANNUAL PHYSICAL  07/06/2022   • INFLUENZA VACCINE  08/01/2022   • HPV VACCINES  Completed   • Pneumococcal Vaccine 0-64  Aged Out       Past Medical History:   Diagnosis Date   • IBS (irritable bowel syndrome)        Past Surgical History:   Procedure Laterality Date   • COLONOSCOPY N/A 12/21/2022    Procedure: COLONOSCOPY FOR SCREENING with Polypectomy;  Surgeon: Shereen Bone DO;  Location: [facility] [place] MAIN OR;  Service: Gastroenterology;  Laterality: N/A;  Rectal Polyp with biopsy forceps   • ENDOSCOPY N/A 12/21/2022    Procedure: ESOPHAGOGASTRODUODENOSCOPY;  Surgeon: Shereen Bone DO;  Location: [facility] [place] MAIN OR;  Service: Gastroenterology;  Laterality: N/A;  Gastritis, Duodenitis   • TEAR DUCT SURGERY Bilateral    • UPPER  "GASTROINTESTINAL ENDOSCOPY     • WISDOM TOOTH EXTRACTION           Current Outpatient Medications:   •  drospirenone-ethinyl estradiol (KATIE,GIANVI) 3-0.02 MG per tablet, Take 1 tablet by mouth Daily., Disp: 84 tablet, Rfl: 3  •  dicyclomine (BENTYL) 10 MG capsule, Take 1 capsule by mouth 3 (Three) Times a Day As Needed (abdominal pain/diarrhea)., Disp: 90 capsule, Rfl: 5  •  omeprazole (priLOSEC) 40 MG capsule, Take 1 capsule by mouth Daily., Disp: 30 capsule, Rfl: 6  •  ondansetron ODT (Zofran ODT) 4 MG disintegrating tablet, Place 1 tablet on the tongue Every 8 (Eight) Hours As Needed for Nausea., Disp: 12 tablet, Rfl: 0  •  traZODone (DESYREL) 50 MG tablet, TAKE 1 TO 2 TABLETS BY MOUTH DAILY AT BEDTIME AS NEEDED FOR INSOMNIA, Disp: 30 tablet, Rfl: 2    No Known Allergies    Social History     Tobacco Use   • Smoking status: Former   • Smokeless tobacco: Never   Vaping Use   • Vaping Use: Never used   Substance Use Topics   • Alcohol use: Never   • Drug use: Never       Family History   Problem Relation Age of Onset   • Irritable bowel syndrome Paternal Aunt    • Colon polyps Paternal Uncle    • Colon cancer Paternal Grandmother    • Crohn's disease Neg Hx    • Ulcerative colitis Neg Hx        Review of Systems   Constitutional: Negative for appetite change, chills, fatigue, fever and unexpected weight change.   Gastrointestinal: Negative for abdominal distention, abdominal pain, anal bleeding, blood in stool, constipation, diarrhea, nausea and vomiting.   Genitourinary: Negative for dyspareunia, dysuria, menstrual problem, pelvic pain, vaginal bleeding, vaginal discharge and vaginal pain.       /78   Ht 167.6 cm (66\")   Wt 63.8 kg (140 lb 9.6 oz)   LMP 12/20/2022 (Exact Date)   BMI 22.69 kg/m²     Physical Exam  Vitals reviewed.   Constitutional:       General: She is not in acute distress.     Appearance: Normal appearance. She is well-developed. She is not ill-appearing, toxic-appearing or " diaphoretic.   HENT:      Mouth/Throat:      Dentition: Normal dentition. No dental caries.   Cardiovascular:      Rate and Rhythm: Normal rate and regular rhythm.      Heart sounds: Normal heart sounds.   Pulmonary:      Effort: Pulmonary effort is normal. No respiratory distress.      Breath sounds: Normal breath sounds. No stridor. No wheezing.   Chest:   Breasts:     Right: No inverted nipple, mass, nipple discharge, skin change or tenderness.      Left: No inverted nipple, mass, nipple discharge, skin change or tenderness.   Abdominal:      General: There is no distension.      Palpations: Abdomen is soft. There is no mass.      Tenderness: There is no abdominal tenderness.   Genitourinary:     General: Normal vulva.      Labia:         Right: No rash, tenderness or lesion.         Left: No rash, tenderness or lesion.       Urethra: No prolapse, urethral pain, urethral swelling or urethral lesion.      Vagina: No vaginal discharge, tenderness or bleeding.      Cervix: No cervical motion tenderness, discharge or friability.      Uterus: Not deviated, not enlarged, not fixed and not tender.       Adnexa:         Right: No mass, tenderness or fullness.          Left: No mass, tenderness or fullness.        Rectum: No tenderness or external hemorrhoid.   Musculoskeletal:         General: No tenderness. Normal range of motion.   Skin:     General: Skin is warm.      Findings: No erythema or rash.   Neurological:      General: No focal deficit present.      Mental Status: She is alert and oriented to person, place, and time. Mental status is at baseline.      Cranial Nerves: No cranial nerve deficit.      Motor: No weakness.      Coordination: Coordination normal.      Gait: Gait normal.   Psychiatric:         Mood and Affect: Mood normal.         Behavior: Behavior normal.         Thought Content: Thought content normal.         Judgment: Judgment normal.            Assessment/Plan    1) GYN HM: Check pap.SBE  demonstrated and encouraged. Declines STD screening.  2)  cyclical joint pain, IBS and PMS: Patient given Junel FE 1-20 and patient began to have anxiety and panic attacks on the medications.  She has a strong history of mental illness.  Patient stopped the meds after 1 week.  Started on Katie and doing better. Refills given.  3) Contraception: Same sex relationship  4) Diet and Exercise reviewed.  5) Hx of PTSD: pt has hx of cutting herself  6) Smoking Status: nonsmoker  7) Social: [facility] is [facility] [employer] studying Biology and Art- [employer]  8) Follow up 1 year/prn       Diagnoses and all orders for this visit:    Encounter for gynecological examination  -     POC Urinalysis Dipstick  -     POC Pregnancy, Urine  -     IGP,CtNgTv,rfx Aptima HPV ASCU    Pap smear, low-risk  -     IGP,CtNgTv,rfx Aptima HPV ASCU    Other orders  -     drospirenone-ethinyl estradiol (KATIE,GIANVI) 3-0.02 MG per tablet; Take 1 tablet by mouth Daily.          Tammy Childers DO  12/27/2022  10:01 EST

## 2023-01-01 LAB
C TRACH RRNA CVX QL NAA+PROBE: NEGATIVE
CONV .: NORMAL
CYTOLOGIST CVX/VAG CYTO: NORMAL
CYTOLOGY CVX/VAG DOC CYTO: NORMAL
CYTOLOGY CVX/VAG DOC THIN PREP: NORMAL
DX ICD CODE: NORMAL
HIV 1 & 2 AB SER-IMP: NORMAL
N GONORRHOEA RRNA CVX QL NAA+PROBE: NEGATIVE
OTHER STN SPEC: NORMAL
STAT OF ADQ CVX/VAG CYTO-IMP: NORMAL
T VAGINALIS RRNA SPEC QL NAA+PROBE: NEGATIVE

## 2023-03-18 DIAGNOSIS — G47.09 OTHER INSOMNIA: ICD-10-CM

## 2023-03-20 RX ORDER — TRAZODONE HYDROCHLORIDE 50 MG/1
TABLET ORAL
Qty: 30 TABLET | Refills: 2 | Status: SHIPPED | OUTPATIENT
Start: 2023-03-20

## 2023-05-11 ENCOUNTER — TELEPHONE (OUTPATIENT)
Dept: SURGERY | Facility: CLINIC | Age: 23
End: 2023-05-11
Payer: COMMERCIAL

## 2023-05-11 DIAGNOSIS — R10.11 RIGHT UPPER QUADRANT ABDOMINAL PAIN: Primary | ICD-10-CM

## 2023-05-11 DIAGNOSIS — R19.7 DIARRHEA, UNSPECIFIED TYPE: ICD-10-CM

## 2023-05-11 DIAGNOSIS — R11.2 NAUSEA AND VOMITING, UNSPECIFIED VOMITING TYPE: ICD-10-CM

## 2023-05-11 NOTE — TELEPHONE ENCOUNTER
Pt had cscope in December. She was given Omeprazole and was told to call us if her symptoms began to worsen. She called today to state that the medication is no longer working and she would like to proceed with the HIDA scan that was discussed at her last appt. Ok to scheduled HIDA? Please advise.   98

## 2023-05-11 NOTE — TELEPHONE ENCOUNTER
Pt has been scheduled for HIDA scan 05/26/23 11:00am  LAG. Pt has been notified of appt and instructions.

## 2023-05-22 ENCOUNTER — HOSPITAL ENCOUNTER (EMERGENCY)
Facility: HOSPITAL | Age: 23
Discharge: HOME | End: 2023-05-22
Attending: EMERGENCY MEDICINE | Admitting: EMERGENCY MEDICINE
Payer: COMMERCIAL

## 2023-05-22 ENCOUNTER — TELEPHONE (OUTPATIENT)
Dept: SURGERY | Facility: CLINIC | Age: 23
End: 2023-05-22
Payer: COMMERCIAL

## 2023-05-22 ENCOUNTER — APPOINTMENT (OUTPATIENT)
Dept: CT IMAGING | Facility: HOSPITAL | Age: 23
End: 2023-05-22
Payer: COMMERCIAL

## 2023-05-22 VITALS
RESPIRATION RATE: 16 BRPM | OXYGEN SATURATION: 99 % | HEIGHT: 66 IN | BODY MASS INDEX: 22.5 KG/M2 | SYSTOLIC BLOOD PRESSURE: 114 MMHG | WEIGHT: 140 LBS | HEART RATE: 76 BPM | TEMPERATURE: 98.5 F | DIASTOLIC BLOOD PRESSURE: 80 MMHG

## 2023-05-22 DIAGNOSIS — N83.201 RIGHT OVARIAN CYST: ICD-10-CM

## 2023-05-22 DIAGNOSIS — R10.9 ABDOMINAL PAIN, UNSPECIFIED ABDOMINAL LOCATION: Primary | ICD-10-CM

## 2023-05-22 LAB
ALBUMIN SERPL-MCNC: 5 G/DL (ref 3.5–5.2)
ALBUMIN/GLOB SERPL: 1.7 G/DL
ALP SERPL-CCNC: 93 U/L (ref 39–117)
ALT SERPL W P-5'-P-CCNC: 15 U/L (ref 1–33)
ANION GAP SERPL CALCULATED.3IONS-SCNC: 13.6 MMOL/L (ref 5–15)
AST SERPL-CCNC: 16 U/L (ref 1–32)
BACTERIA UR QL AUTO: ABNORMAL /HPF
BASOPHILS # BLD AUTO: 0.03 10*3/MM3 (ref 0–0.2)
BASOPHILS NFR BLD AUTO: 0.4 % (ref 0–1.5)
BILIRUB SERPL-MCNC: 0.5 MG/DL (ref 0–1.2)
BILIRUB UR QL STRIP: NEGATIVE
BUN SERPL-MCNC: 13 MG/DL (ref 6–20)
BUN/CREAT SERPL: 17.1 (ref 7–25)
CALCIUM SPEC-SCNC: 10 MG/DL (ref 8.6–10.5)
CHLORIDE SERPL-SCNC: 102 MMOL/L (ref 98–107)
CLARITY UR: CLEAR
CO2 SERPL-SCNC: 23.4 MMOL/L (ref 22–29)
COLOR UR: YELLOW
CREAT SERPL-MCNC: 0.76 MG/DL (ref 0.57–1)
DEPRECATED RDW RBC AUTO: 40.1 FL (ref 37–54)
EGFRCR SERPLBLD CKD-EPI 2021: 112.4 ML/MIN/1.73
EOSINOPHIL # BLD AUTO: 0.02 10*3/MM3 (ref 0–0.4)
EOSINOPHIL NFR BLD AUTO: 0.3 % (ref 0.3–6.2)
ERYTHROCYTE [DISTWIDTH] IN BLOOD BY AUTOMATED COUNT: 11.9 % (ref 12.3–15.4)
GLOBULIN UR ELPH-MCNC: 2.9 GM/DL
GLUCOSE SERPL-MCNC: 104 MG/DL (ref 65–99)
GLUCOSE UR STRIP-MCNC: NEGATIVE MG/DL
HCG SERPL QL: NEGATIVE
HCT VFR BLD AUTO: 43.9 % (ref 34–46.6)
HGB BLD-MCNC: 14.8 G/DL (ref 12–15.9)
HGB UR QL STRIP.AUTO: NEGATIVE
HOLD SPECIMEN: NORMAL
HYALINE CASTS UR QL AUTO: ABNORMAL /LPF
IMM GRANULOCYTES # BLD AUTO: 0.03 10*3/MM3 (ref 0–0.05)
IMM GRANULOCYTES NFR BLD AUTO: 0.4 % (ref 0–0.5)
KETONES UR QL STRIP: ABNORMAL
LEUKOCYTE ESTERASE UR QL STRIP.AUTO: ABNORMAL
LIPASE SERPL-CCNC: 38 U/L (ref 13–60)
LYMPHOCYTES # BLD AUTO: 1.82 10*3/MM3 (ref 0.7–3.1)
LYMPHOCYTES NFR BLD AUTO: 27 % (ref 19.6–45.3)
MCH RBC QN AUTO: 30.5 PG (ref 26.6–33)
MCHC RBC AUTO-ENTMCNC: 33.7 G/DL (ref 31.5–35.7)
MCV RBC AUTO: 90.5 FL (ref 79–97)
MONOCYTES # BLD AUTO: 0.36 10*3/MM3 (ref 0.1–0.9)
MONOCYTES NFR BLD AUTO: 5.3 % (ref 5–12)
NEUTROPHILS NFR BLD AUTO: 4.47 10*3/MM3 (ref 1.7–7)
NEUTROPHILS NFR BLD AUTO: 66.6 % (ref 42.7–76)
NITRITE UR QL STRIP: NEGATIVE
NRBC BLD AUTO-RTO: 0 /100 WBC (ref 0–0.2)
PH UR STRIP.AUTO: 5.5 [PH] (ref 5–8)
PLATELET # BLD AUTO: 307 10*3/MM3 (ref 140–450)
PMV BLD AUTO: 9.4 FL (ref 6–12)
POTASSIUM SERPL-SCNC: 4.5 MMOL/L (ref 3.5–5.2)
PROT SERPL-MCNC: 7.9 G/DL (ref 6–8.5)
PROT UR QL STRIP: NEGATIVE
RBC # BLD AUTO: 4.85 10*6/MM3 (ref 3.77–5.28)
RBC # UR STRIP: ABNORMAL /HPF
REF LAB TEST METHOD: ABNORMAL
SODIUM SERPL-SCNC: 139 MMOL/L (ref 136–145)
SP GR UR STRIP: 1.01 (ref 1–1.03)
SQUAMOUS #/AREA URNS HPF: ABNORMAL /HPF
UROBILINOGEN UR QL STRIP: ABNORMAL
WBC # UR STRIP: ABNORMAL /HPF
WBC NRBC COR # BLD: 6.73 10*3/MM3 (ref 3.4–10.8)
WHOLE BLOOD HOLD COAG: NORMAL
WHOLE BLOOD HOLD SPECIMEN: NORMAL

## 2023-05-22 PROCEDURE — 85025 COMPLETE CBC W/AUTO DIFF WBC: CPT

## 2023-05-22 PROCEDURE — 84703 CHORIONIC GONADOTROPIN ASSAY: CPT

## 2023-05-22 PROCEDURE — 25010000002 MORPHINE PER 10 MG: Performed by: EMERGENCY MEDICINE

## 2023-05-22 PROCEDURE — 83690 ASSAY OF LIPASE: CPT

## 2023-05-22 PROCEDURE — 74177 CT ABD & PELVIS W/CONTRAST: CPT

## 2023-05-22 PROCEDURE — 99283 EMERGENCY DEPT VISIT LOW MDM: CPT

## 2023-05-22 PROCEDURE — 96374 THER/PROPH/DIAG INJ IV PUSH: CPT

## 2023-05-22 PROCEDURE — 96375 TX/PRO/DX INJ NEW DRUG ADDON: CPT

## 2023-05-22 PROCEDURE — 80053 COMPREHEN METABOLIC PANEL: CPT

## 2023-05-22 PROCEDURE — 81001 URINALYSIS AUTO W/SCOPE: CPT | Performed by: EMERGENCY MEDICINE

## 2023-05-22 PROCEDURE — 25510000001 IOPAMIDOL 61 % SOLUTION: Performed by: EMERGENCY MEDICINE

## 2023-05-22 PROCEDURE — 36415 COLL VENOUS BLD VENIPUNCTURE: CPT

## 2023-05-22 PROCEDURE — 25010000002 DROPERIDOL PER 5 MG: Performed by: EMERGENCY MEDICINE

## 2023-05-22 RX ORDER — ONDANSETRON 4 MG/1
4 TABLET, ORALLY DISINTEGRATING ORAL EVERY 8 HOURS PRN
Qty: 12 TABLET | Refills: 0 | Status: SHIPPED | OUTPATIENT
Start: 2023-05-22 | End: 2023-05-26 | Stop reason: SDUPTHER

## 2023-05-22 RX ORDER — MORPHINE SULFATE 2 MG/ML
4 INJECTION, SOLUTION INTRAMUSCULAR; INTRAVENOUS ONCE
Status: COMPLETED | OUTPATIENT
Start: 2023-05-22 | End: 2023-05-22

## 2023-05-22 RX ORDER — ONDANSETRON 2 MG/ML
4 INJECTION INTRAMUSCULAR; INTRAVENOUS ONCE
Status: DISCONTINUED | OUTPATIENT
Start: 2023-05-22 | End: 2023-05-22

## 2023-05-22 RX ORDER — DICYCLOMINE HYDROCHLORIDE 10 MG/1
10 CAPSULE ORAL EVERY 6 HOURS PRN
Qty: 20 CAPSULE | Refills: 0 | Status: SHIPPED | OUTPATIENT
Start: 2023-05-22 | End: 2023-05-26 | Stop reason: SDUPTHER

## 2023-05-22 RX ORDER — DROPERIDOL 2.5 MG/ML
1.25 INJECTION, SOLUTION INTRAMUSCULAR; INTRAVENOUS ONCE
Status: COMPLETED | OUTPATIENT
Start: 2023-05-22 | End: 2023-05-22

## 2023-05-22 RX ORDER — SODIUM CHLORIDE 0.9 % (FLUSH) 0.9 %
10 SYRINGE (ML) INJECTION AS NEEDED
Status: DISCONTINUED | OUTPATIENT
Start: 2023-05-22 | End: 2023-05-23 | Stop reason: HOSPADM

## 2023-05-22 RX ADMIN — SODIUM CHLORIDE 1000 ML: 9 INJECTION, SOLUTION INTRAVENOUS at 20:56

## 2023-05-22 RX ADMIN — DROPERIDOL 1.25 MG: 2.5 INJECTION, SOLUTION INTRAMUSCULAR; INTRAVENOUS at 20:57

## 2023-05-22 RX ADMIN — IOPAMIDOL 85 ML: 612 INJECTION, SOLUTION INTRAVENOUS at 21:18

## 2023-05-22 RX ADMIN — MORPHINE SULFATE 4 MG: 2 INJECTION, SOLUTION INTRAMUSCULAR; INTRAVENOUS at 20:56

## 2023-05-22 NOTE — TELEPHONE ENCOUNTER
Caller: COBY HAYNES   Best call back number: [phone]    Who are you requesting to speak with (clinical staff, provider,  specific staff member): MA     What was the call regarding: PT ADVISED SHE HAS BEEN HAVING RIGHT UPPER ABDOMINAL PAIN AND IS  SCHEDULED FOR A HIDA SCAN ON 05/24.    SHE STATES SHE HAS CONSTANT PRESSURE PAIN, HOT FLASHES, NAUSEA AND FEELING UNCOMFORTABLE TO THE POINT WHERE SHE HASN'T BEEN ABLE TO SLEEP/REST.     SHE IS INQUIRING IF THERE IS ANYTHING SHE CAN DO OR TAKE TO RELIEVE SYMPTOMS IN THE MEANTIME?     Do you require a callback:YES

## 2023-05-22 NOTE — TELEPHONE ENCOUNTER
Spoke with pt and she is req something to help her sleep and help with the hot flashes.  Pt advised to contact PCP.

## 2023-05-23 NOTE — ED PROVIDER NOTES
EMERGENCY DEPARTMENT ENCOUNTER    Room Number:  T03/03  Date of encounter:  5/22/2023  PCP: Marion Jaquez APRN  Patient Care Team:  Marion Jaquez APRN as PCP - General (Nurse Practitioner)  Ayo Correa MD as Consulting Physician (Gastroenterology)   Independent Historians: Patient    HPI:  Chief Complaint: Abdominal pain   A complete HPI/ROS/PMH/PSH/SH/FH are unobtainable due to: N/A    Chronic or social conditions impacting patient care (social determinants of health): None    Context: Aggie Mitchell is a 22 y.o. female with past medical history of IBS who arrives to the ED with complaint of right sided abdominal pain.  Patient states that she has been having abdominal pains for a while that worsened today and have been associated with nausea and diarrhea.  Denies any vomiting, constipation, or fever.  Patient is currently being seen by a surgeon and has an outpatient HIDA scan scheduled for Wednesday, but the pain became unbearable today and she presented to the ER for further evaluation.    Review of prior external notes (non-ED): Office notes from Dr. Bone    Review of prior external test results outside of this encounter: Patient had a gallbladder ultrasound performed on 10/3/2021 that was normal and a CT of the abdomen and pelvis performed on 7/11/2022 for evaluation of right lower quadrant abdominal pain for the past year that showed nothing acute    PAST MEDICAL HISTORY  Active Ambulatory Problems     Diagnosis Date Noted   • Vaginal pain 08/20/2019   • PMS (premenstrual syndrome) 08/20/2019   • Diarrhea 11/01/2022   • Nausea and vomiting 11/01/2022   • Right upper quadrant abdominal pain 11/01/2022   • Left lower quadrant abdominal pain 11/01/2022   • Abnormal CT of the abdomen 11/01/2022     Resolved Ambulatory Problems     Diagnosis Date Noted   • No Resolved Ambulatory Problems     Past Medical History:   Diagnosis Date   • IBS (irritable bowel syndrome)        The patient has  started, but not completed, their COVID-19 vaccination series.    PAST SURGICAL HISTORY  Past Surgical History:   Procedure Laterality Date   • COLONOSCOPY N/A 12/21/2022    Procedure: COLONOSCOPY FOR SCREENING with Polypectomy;  Surgeon: Shereen Bone DO;  [place] [facility] [place];  Service: Gastroenterology;  Laterality: N/A;  Rectal Polyp with biopsy forceps   • ENDOSCOPY N/A 12/21/2022    Procedure: ESOPHAGOGASTRODUODENOSCOPY;  Surgeon: Shereen Bone DO;  [place] [facility] [place];  Service: Gastroenterology;  Laterality: N/A;  Gastritis, Duodenitis   • TEAR DUCT SURGERY Bilateral    • UPPER GASTROINTESTINAL ENDOSCOPY     • WISDOM TOOTH EXTRACTION           FAMILY HISTORY  Family History   Problem Relation Age of Onset   • Irritable bowel syndrome Paternal Aunt    • Colon polyps Paternal Uncle    • Colon cancer Paternal Grandmother    • Crohn's disease Neg Hx    • Ulcerative colitis Neg Hx          SOCIAL HISTORY  Social History     Socioeconomic History   • Marital status: Single   Tobacco Use   • Smoking status: Former   • Smokeless tobacco: Never   Vaping Use   • Vaping Use: Never used   Substance and Sexual Activity   • Alcohol use: Never   • Drug use: Never   • Sexual activity: Yes     Partners: Male         ALLERGIES  Patient has no known allergies.        REVIEW OF SYSTEMS  Review of Systems     All systems reviewed and negative except for those discussed in HPI.       PHYSICAL EXAM    I have reviewed the triage vital signs and nursing notes.    ED Triage Vitals [05/22/23 1848]   Temp Heart Rate Resp BP SpO2   98.5 °F (36.9 °C) 75 16 -- 96 %      Temp src Heart Rate Source Patient Position BP Location FiO2 (%)   -- -- -- -- --       Physical Exam    GENERAL: alert and oriented x4, mildly distressed, uncooperative  HENT: normocephalic, atraumatic, moist mucous membranes  EYES: no scleral icterus, PERRL, EOMI  CV: regular rhythm, regular rate, no murmurs, rubs, gallops  RESPIRATORY: normal  effort, CTAB  ABDOMEN: soft/nontender, no rebound or guarding, normal bowel sounds  MUSCULOSKELETAL: no deformity  NEURO: alert, moves all extremities, no focal neuro deficits, follows commands  SKIN: warm, dry, no rash   Psych: Appropriate mood and affect      Nursing notes and vital signs reviewed      LAB RESULTS  Recent Results (from the past 24 hour(s))   Comprehensive Metabolic Panel    Collection Time: 05/22/23  6:54 PM    Specimen: Blood   Result Value Ref Range    Glucose 104 (H) 65 - 99 mg/dL    BUN 13 6 - 20 mg/dL    Creatinine 0.76 0.57 - 1.00 mg/dL    Sodium 139 136 - 145 mmol/L    Potassium 4.5 3.5 - 5.2 mmol/L    Chloride 102 98 - 107 mmol/L    CO2 23.4 22.0 - 29.0 mmol/L    Calcium 10.0 8.6 - 10.5 mg/dL    Total Protein 7.9 6.0 - 8.5 g/dL    Albumin 5.0 3.5 - 5.2 g/dL    ALT (SGPT) 15 1 - 33 U/L    AST (SGOT) 16 1 - 32 U/L    Alkaline Phosphatase 93 39 - 117 U/L    Total Bilirubin 0.5 0.0 - 1.2 mg/dL    Globulin 2.9 gm/dL    A/G Ratio 1.7 g/dL    BUN/Creatinine Ratio 17.1 7.0 - 25.0    Anion Gap 13.6 5.0 - 15.0 mmol/L    eGFR 112.4 >60.0 mL/min/1.73   Lipase    Collection Time: 05/22/23  6:54 PM    Specimen: Blood   Result Value Ref Range    Lipase 38 13 - 60 U/L   hCG, Serum, Qualitative    Collection Time: 05/22/23  6:54 PM    Specimen: Blood   Result Value Ref Range    HCG Qualitative Negative Negative   Green Top (Gel)    Collection Time: 05/22/23  6:54 PM   Result Value Ref Range    Extra Tube Hold for add-ons.    Lavender Top    Collection Time: 05/22/23  6:54 PM   Result Value Ref Range    Extra Tube hold for add-on    Light Blue Top    Collection Time: 05/22/23  6:54 PM   Result Value Ref Range    Extra Tube Hold for add-ons.    CBC Auto Differential    Collection Time: 05/22/23  6:54 PM    Specimen: Blood   Result Value Ref Range    WBC 6.73 3.40 - 10.80 10*3/mm3    RBC 4.85 3.77 - 5.28 10*6/mm3    Hemoglobin 14.8 12.0 - 15.9 g/dL    Hematocrit 43.9 34.0 - 46.6 %    MCV 90.5 79.0 - 97.0 fL     MCH 30.5 26.6 - 33.0 pg    MCHC 33.7 31.5 - 35.7 g/dL    RDW 11.9 (L) 12.3 - 15.4 %    RDW-SD 40.1 37.0 - 54.0 fl    MPV 9.4 6.0 - 12.0 fL    Platelets 307 140 - 450 10*3/mm3    Neutrophil % 66.6 42.7 - 76.0 %    Lymphocyte % 27.0 19.6 - 45.3 %    Monocyte % 5.3 5.0 - 12.0 %    Eosinophil % 0.3 0.3 - 6.2 %    Basophil % 0.4 0.0 - 1.5 %    Immature Grans % 0.4 0.0 - 0.5 %    Neutrophils, Absolute 4.47 1.70 - 7.00 10*3/mm3    Lymphocytes, Absolute 1.82 0.70 - 3.10 10*3/mm3    Monocytes, Absolute 0.36 0.10 - 0.90 10*3/mm3    Eosinophils, Absolute 0.02 0.00 - 0.40 10*3/mm3    Basophils, Absolute 0.03 0.00 - 0.20 10*3/mm3    Immature Grans, Absolute 0.03 0.00 - 0.05 10*3/mm3    nRBC 0.0 0.0 - 0.2 /100 WBC   Urinalysis With Microscopic If Indicated (No Culture) - Urine, Clean Catch    Collection Time: 05/22/23  8:20 PM    Specimen: Urine, Clean Catch   Result Value Ref Range    Color, UA Yellow Yellow, Straw    Appearance, UA Clear Clear    pH, UA 5.5 5.0 - 8.0    Specific Gravity, UA 1.011 1.005 - 1.030    Glucose, UA Negative Negative    Ketones, UA Trace (A) Negative    Bilirubin, UA Negative Negative    Blood, UA Negative Negative    Protein, UA Negative Negative    Leuk Esterase, UA Small (1+) (A) Negative    Nitrite, UA Negative Negative    Urobilinogen, UA 0.2 E.U./dL 0.2 - 1.0 E.U./dL   Urinalysis, Microscopic Only - Urine, Clean Catch    Collection Time: 05/22/23  8:20 PM    Specimen: Urine, Clean Catch   Result Value Ref Range    RBC, UA 0-2 None Seen, 0-2 /HPF    WBC, UA 6-12 (A) None Seen, 0-2 /HPF    Bacteria, UA None Seen None Seen /HPF    Squamous Epithelial Cells, UA 0-2 None Seen, 0-2 /HPF    Hyaline Casts, UA 0-2 None Seen /LPF    Methodology Automated Microscopy        Ordered the above labs and independently reviewed and interpreted the results by me.        RADIOLOGY  CT Abdomen Pelvis With Contrast    Result Date: 5/22/2023  Patient: KADI HAYNES  Time Out: 22:14 Exam(s): CT ABDOMEN + PELVIS With  Contrast EXAM:   CT Abdomen and Pelvis With Intravenous Contrast CLINICAL HISTORY:    right sided abdominal pain. TECHNIQUE:   Axial computed tomography images of the abdomen and pelvis with intravenous contrast.  CTDI is 8.24 mGy and DLP is 459.9 mGy-cm.  This CT exam was performed according to the principle of ALARA (As Low As Reasonably Achievable) by using one or more of the following dose reduction techniques: automated exposure control, adjustment of the mA and or kV according to patient size, and or use of iterative reconstruction technique. COMPARISON:   No relevant prior studies available. FINDINGS:   Limitations:  There is respiratory artifact, which degrades image quality on multiple image slices.   Lung bases:  Unremarkable.  No mass.  No consolidation.   Pleural space:  Trace layering subcentimeter pleural effusions noted in the posterior costophrenic margins bilaterally.  ABDOMEN:   Liver:  Unremarkable.  No mass.   Gallbladder and bile ducts:  Unremarkable.  No calcified stones.  No ductal dilation.   Pancreas:  Unremarkable.  No mass.  No ductal dilation.   Spleen:  Unremarkable.  No splenomegaly.   Adrenals:  Unremarkable.  No mass.   Kidneys and ureters:  Unremarkable.  No solid mass.  No hydronephrosis.   Stomach and bowel:  Mild stool burden noted throughout the colon.  No evidence for bowel obstruction.  No asymmetric bowel mucosal abnormality.  PELVIS:   Appendix:  A normal caliber appendix is noted along the posterior medial aspect of the cecum.   Bladder:  Unremarkable.  No mass.   Reproductive:  Nondominant follicular changes suggested involving the right adnexa ovary.  Uterus is unremarkable.  The left ovary adnexa is poorly delineated.  No mass.  ABDOMEN and PELVIS:   Intraperitoneal space:  Unremarkable.  No free air.  No significant fluid collection.   Bones joints:  No acute fracture.  No dislocation.   Soft tissues:  Unremarkable.   Vasculature:  Unremarkable.  No abdominal aortic  aneurysm.   Lymph nodes:  Unremarkable.  No enlarged lymph nodes. IMPRESSION:     1.  A normal caliber appendix is noted along the posterior medial aspect of the cecum.  No bowel obstruction. 2.  Trace layering subcentimeter pleural effusions noted in the posterior costophrenic margins bilaterally. 3.  Nondominant follicular changes suggested involving the right adnexa ovary.  The clinical significance of this finding is indeterminant and this may be incidental.     Electronically signed by Ramos Campuzano MD on 05-22-23 at 2214      I ordered the above noted radiological studies.  These were independently interpreted and reviewed by me.  See dictation for official radiology interpretation.      PROCEDURES    Procedures      MEDICATIONS GIVEN IN ER    Medications   sodium chloride 0.9 % flush 10 mL (has no administration in time range)   morphine injection 4 mg (4 mg Intravenous Given 5/22/23 2056)   sodium chloride 0.9 % bolus 1,000 mL (0 mL Intravenous Stopped 5/22/23 2126)   droperidol (INAPSINE) injection 1.25 mg (1.25 mg Intravenous Given 5/22/23 2057)   iopamidol (ISOVUE-300) 61 % injection 100 mL (85 mL Intravenous Given by Other 5/22/23 2118)         PROGRESS, DATA ANALYSIS, CONSULTS, AND MEDICAL DECISION MAKING    All labs have been independently reviewed by me.  All radiology studies have been reviewed by me and discussed with radiologist dictating the report.   EKG's independently viewed and interpreted by me.  Discussion below represents my analysis of pertinent findings related to patient's condition, differential diagnosis, treatment plan and final disposition.    DDx:  Includes, but is not limited to appendicitis, cholelithiasis, cholecystitis, pyelonephritis, ureterolithiasis, UTI, ovarian cyst, ovarian torsion      ED Course as of 05/22/23 2255   Mon May 22, 2023   2030 HCG Qualitative: Negative [BASHIR]   2030 WBC: 6.73 [BASHIR]   2030 Hemoglobin: 14.8 [BASHIR]   2030 Hematocrit: 43.9 [BASHIR]   2030 Platelets:  307 [BASHIR]   2030 Glucose(!): 104 [BASHIR]   2030 BUN: 13 [BASHIR]   2030 Creatinine: 0.76 [BASHIR]   2030 Sodium: 139 [BASHIR]   2030 Potassium: 4.5 [BASHIR]   2030 Chloride: 102 [BASHIR]   2030 CO2: 23.4 [BASHIR]   2030 Lipase: 38 [BASHIR]   2035 Ketones, UA(!): Trace [BASHIR]   2035 Leukocytes, UA(!): Small (1+) [BASHIR]   2035 Nitrite, UA: Negative [BASHIR]   2035 WBC, UA(!): 6-12 [BASHIR]   2035 Bacteria, UA: None Seen [BASHIR]   2205 CT abdomen/pelvis personally interpreted by me.  My personal interpretation is: Lung bases are clear.  No gallstones.  No obstructive uropathy. []   2245 Patient rechecked, improved.  Discussed results, diagnosis, and treatment plan.  Patient expresses understanding and agrees with plan. [BASHIR]      ED Course User Index  [BASHIR] Camilo Alicea PA  [] Juan Miguel Kumar MD       MDM: Patient's evaluation is unremarkable, blood work and urine show no acute abnormality and CT scan shows possibility of a ovarian cyst, however the appendix and gallbladder are unremarkable and there is no evidence for acute intracranial abnormality.  Patient has follow-up with her doctor in 2 days for outpatient HIDA scan, vital signs are stable, patient is safe for discharge home.    PPE:  The patient wore a mask and I wore a mask and all appropriate PPE throughout the entire patient encounter.      AS OF 22:55 EDT VITALS:    BP - 127/85  HR - 90  TEMP - 98.5 °F (36.9 °C)  O2 SATS - 98%      DIAGNOSIS  Final diagnoses:   Abdominal pain, unspecified abdominal location   Right ovarian cyst         DISPOSITION  DISCHARGE    Patient discharged in stable condition.    Reviewed implications of results, diagnosis, meds, responsibility to follow up, warning signs and symptoms of possible worsening, potential complications and reasons to return to ER.    Patient/Family voiced understanding of above instructions.    Discussed plan for discharge, as there is no emergent indication for admission. Patient referred to primary care provider for BP management due to  today's BP. Pt/family is agreeable and understands need for follow up and repeat testing.  Pt is aware that discharge does not mean that nothing is wrong but it indicates no emergency is present that requires admission and they must continue care with follow-up as given below or physician of their choice.     FOLLOW-UP  Marion Jaquez, APRN  [address]  [address]  [facility] [id number] [phone]    Schedule an appointment as soon as possible for a visit       Shereen Bone DO  [address]  [address]  [facility] [id number] [phone]    Schedule an appointment as soon as possible for a visit            Medication List      New Prescriptions    dicyclomine 10 MG capsule  Commonly known as: BENTYL  Take 1 capsule by mouth Every 6 (Six) Hours As Needed (abdominal pain).     ondansetron ODT 4 MG disintegrating tablet  Commonly known as: ZOFRAN-ODT  Place 1 tablet on the tongue Every 8 (Eight) Hours As Needed for Nausea or Vomiting.           Where to Get Your Medications      These medications were sent to [id number] - [place], [place] - [address] [place] [address] AT [facility]  MICHAEL  [phone] [facility] [phone]   [address] [place] [id number]    Phone: [phone]   · dicyclomine 10 MG capsule  · ondansetron ODT 4 MG disintegrating tablet           Note Disclaimer: At [facility], we believe that sharing information builds trust and better relationships. You are receiving this note because you recently visited [facility]. It is possible you will see health information before a provider has talked with you about it. This kind of information can be easy to misunderstand. To help you fully understand what it means for your health, we urge you to discuss this note with your provider.     Camilo Alicea PA  05/22/23 0670

## 2023-05-23 NOTE — ED PROVIDER NOTES
MD ATTESTATION NOTE    The NADER and I have discussed this patient's history, physical exam, and treatment plan.  I have reviewed the documentation and personally had a face to face interaction with the patient. I affirm the documentation and agree with the treatment and plan.  The attached note describes my personal findings.      I provided a substantive portion of the care of the patient.  I personally performed the physical exam in its entirety, and below are my findings.  For this patient encounter, the patient wore surgical mask, I wore full protective PPE including N95 and eye protection.      Brief HPI: Patient complains of right upper quadrant pain for the past few days.  Pain worsened today.  She reports associated nausea.  She has a history of IBS.  She is scheduled for a HIDA scan later this week.  Denies fever, chest pain, diarrhea, constipation, or dysuria.    PHYSICAL EXAM  ED Triage Vitals [05/22/23 1848]   Temp Heart Rate Resp BP SpO2   98.5 °F (36.9 °C) 75 16 -- 96 %      Temp src Heart Rate Source Patient Position BP Location FiO2 (%)   -- -- -- -- --         GENERAL: Awake, alert, oriented x3.  Well-developed, well-nourished female.   She appears uncomfortable  HENT: nares patent  EYES: no scleral icterus  CV: regular rhythm, normal rate  RESPIRATORY: normal effort, clear to auscultation bilateral  ABDOMEN: soft, there is right upper quadrant tenderness without rebound or guarding, no CVA tenderness  MUSCULOSKELETAL: no deformity  NEURO: alert, moves all extremities, follows commands  PSYCH:  calm, cooperative  SKIN: warm, dry    Vital signs and nursing notes reviewed.        Plan: Labs are unremarkable.  Will order CT for further evaluation.  Patient will be given IV fluids and IV medications for pain and nausea.    Work-up is basically unremarkable.  Her symptoms improved with IV medications.  She will be discharged.    ED Course as of 05/22/23 2341   Mon May 22, 2023   2030 HCG Qualitative:  Negative [BASHIR]   2030 WBC: 6.73 [BASHIR]   2030 Hemoglobin: 14.8 [BASHIR]   2030 Hematocrit: 43.9 [BASHIR]   2030 Platelets: 307 [BASHIR]   2030 Glucose(!): 104 [BASHIR]   2030 BUN: 13 [BASHIR]   2030 Creatinine: 0.76 [BASHIR]   2030 Sodium: 139 [BASHIR]   2030 Potassium: 4.5 [BASHIR]   2030 Chloride: 102 [BASHIR]   2030 CO2: 23.4 [BASHIR]   2030 Lipase: 38 [BASHIR]   2035 Ketones, UA(!): Trace [BASHIR]   2035 Leukocytes, UA(!): Small (1+) [BASHIR]   2035 Nitrite, UA: Negative [BASHIR]   2035 WBC, UA(!): 6-12 [BASHIR]   2035 Bacteria, UA: None Seen [BASHIR]   2205 CT abdomen/pelvis personally interpreted by me.  My personal interpretation is: Lung bases are clear.  No gallstones.  No obstructive uropathy. []   2245 Patient rechecked, improved.  Discussed results, diagnosis, and treatment plan.  Patient expresses understanding and agrees with plan. [BASHIR]      ED Course User Index  [BASHIR] Camilo Alicea PA  [WH] Juan Miguel Kumar MD Holland, William D, MD  05/22/23 3981

## 2023-05-23 NOTE — DISCHARGE INSTRUCTIONS
Home, rest, medicine as prescribed, keep well-hydrated, follow up with your PCP and surgeon for recheck and further evaluation with your outpatient HIDA scan on Wednesday as scheduled. Return to care with further concerns.

## 2023-05-24 ENCOUNTER — HOSPITAL ENCOUNTER (OUTPATIENT)
Dept: NUCLEAR MEDICINE | Facility: HOSPITAL | Age: 23
Discharge: HOME | End: 2023-05-24
Payer: COMMERCIAL

## 2023-05-24 DIAGNOSIS — R11.2 NAUSEA AND VOMITING, UNSPECIFIED VOMITING TYPE: ICD-10-CM

## 2023-05-24 DIAGNOSIS — R10.11 RIGHT UPPER QUADRANT ABDOMINAL PAIN: ICD-10-CM

## 2023-05-24 DIAGNOSIS — R19.7 DIARRHEA, UNSPECIFIED TYPE: ICD-10-CM

## 2023-05-24 PROCEDURE — A9537 TC99M MEBROFENIN: HCPCS | Performed by: SURGERY

## 2023-05-24 PROCEDURE — 78226 HEPATOBILIARY SYSTEM IMAGING: CPT

## 2023-05-24 PROCEDURE — 0 TECHNETIUM TC 99M MEBROFENIN KIT: Performed by: SURGERY

## 2023-05-24 RX ORDER — KIT FOR THE PREPARATION OF TECHNETIUM TC 99M MEBROFENIN 45 MG/10ML
1 INJECTION, POWDER, LYOPHILIZED, FOR SOLUTION INTRAVENOUS
Status: COMPLETED | OUTPATIENT
Start: 2023-05-24 | End: 2023-05-24

## 2023-05-24 RX ADMIN — MEBROFENIN 1 DOSE: 45 INJECTION, POWDER, LYOPHILIZED, FOR SOLUTION INTRAVENOUS at 12:51

## 2023-05-25 ENCOUNTER — TELEPHONE (OUTPATIENT)
Dept: FAMILY MEDICINE CLINIC | Facility: CLINIC | Age: 23
End: 2023-05-25

## 2023-05-25 ENCOUNTER — TELEPHONE (OUTPATIENT)
Dept: SURGERY | Facility: CLINIC | Age: 23
End: 2023-05-25

## 2023-05-25 ENCOUNTER — TELEPHONE (OUTPATIENT)
Dept: SURGERY | Facility: CLINIC | Age: 23
End: 2023-05-25
Payer: COMMERCIAL

## 2023-05-25 NOTE — TELEPHONE ENCOUNTER
Spoke with pt and she is req help obtaining a gyne appt ASAP to evaluate her source of pain.  Appt obtained with Dr. Dumont @ EP location 05/30/2023 @ 10:00 am.  Will cx appt with Dr. Bone.  Pt is agreeable.

## 2023-05-25 NOTE — TELEPHONE ENCOUNTER
Rec'd call from the hub stating pt's friend was on the line req a RF on pain meds that were Rx'd in ER.  I explained that Dr. Bone does not RF pain meds Rx'd by other providers.    Hub immediately called back and states friend wants to know if pt comes in for OV tomorrow could she get a RF of pain meds because she does not have enough to get through the weekend.  Informed that pt may have an appointment tomorrow but Dr. Bone would not RF the pain meds.

## 2023-05-25 NOTE — TELEPHONE ENCOUNTER
AFTER HOURS CALL  Hub staff attempted to follow warm transfer process and was unsuccessful     Caller: KADI HAYNES    Relationship to patient: SELF    Best call back number: [phone]    Patient is needing: SAME DAY APPT; PT WAS ORIGINALLY SCHED 5.26.23 @ 930AM. WOULD LIKE TO BE SEEN.     HUB UNABLE TO SCHED SAME DAY APPT.       RX REQUEST FOR dicyclomine (BENTYL) 10 MG    PT WILL RUN OUT OF RX OVER THE WEEKEND.

## 2023-05-25 NOTE — TELEPHONE ENCOUNTER
"Caller: Aggie Mitchell \"Ana\"    Best call back number: [phone]    What is the medical concern/diagnosis: OVARIAN CYST    What specialty or service is being requested: OBGYN    What is the provider, practice or medical service name: [facility]    Any additional details: PATIENT WENT TO [facility] EMERGENCY ROOM 5/22/2023 AND WAS DIAGNOSED WITH AN OVARIAN CYST. SHES ON PAIN MEDICATION FOR A LOT OF PAIN.  "

## 2023-05-26 ENCOUNTER — TELEPHONE (OUTPATIENT)
Dept: FAMILY MEDICINE CLINIC | Facility: CLINIC | Age: 23
End: 2023-05-26

## 2023-05-26 ENCOUNTER — OFFICE VISIT (OUTPATIENT)
Dept: FAMILY MEDICINE CLINIC | Facility: CLINIC | Age: 23
End: 2023-05-26

## 2023-05-26 VITALS
HEIGHT: 66 IN | BODY MASS INDEX: 22.6 KG/M2 | HEART RATE: 91 BPM | OXYGEN SATURATION: 99 % | DIASTOLIC BLOOD PRESSURE: 60 MMHG | SYSTOLIC BLOOD PRESSURE: 100 MMHG | TEMPERATURE: 97.3 F

## 2023-05-26 DIAGNOSIS — R10.11 RIGHT UPPER QUADRANT PAIN: Primary | ICD-10-CM

## 2023-05-26 DIAGNOSIS — R11.0 NAUSEA: ICD-10-CM

## 2023-05-26 DIAGNOSIS — R10.13 EPIGASTRIC PAIN: ICD-10-CM

## 2023-05-26 PROCEDURE — 99213 OFFICE O/P EST LOW 20 MIN: CPT | Performed by: NURSE PRACTITIONER

## 2023-05-26 RX ORDER — DICYCLOMINE HYDROCHLORIDE 10 MG/1
10 CAPSULE ORAL
Qty: 60 CAPSULE | Refills: 0 | Status: SHIPPED | OUTPATIENT
Start: 2023-05-26

## 2023-05-26 RX ORDER — ONDANSETRON 4 MG/1
4 TABLET, ORALLY DISINTEGRATING ORAL EVERY 8 HOURS PRN
Qty: 20 TABLET | Refills: 0 | Status: SHIPPED | OUTPATIENT
Start: 2023-05-26

## 2023-05-26 NOTE — PROGRESS NOTES
"Chief Complaint  Hospital Follow Up Visit (Hospital f/u for abdominal pain)    Subjective        Aggie Mitchell presents to [facility]  History of Present Illness Here with  for ER f/u.  She is still having RUQ pain which became so severe recently that she went to ER for eval.  She was found to have right ovarian cyst which was thought at that time to be etiology for pain.  She will be seeing GYN on Tuesday for this.  She was given dicyclomine which she will run out of and is finding it helpful for abdominal symptoms.    She has quite a bit of fatigue which is chronic but worsened over the last several months.  She is also having nausea.  She is taking Tylenol ES for pain which has not been helpful.      She was seen by gastro previously for right upper quad pain that has been chronic.  She was then referred by gastro to surgery.  Surgeon did not feel like her pain was due to gallbladder-had negative HIDA scan and patient feels unclear on what she can do next.  Is feeling frustrated with not being able to get answers for her pain and lethargy.        Objective   Vital Signs:  /60   Pulse 91   Temp 97.3 °F (36.3 °C)   Ht 167.6 cm (66\")   SpO2 99%   BMI 22.60 kg/m²   Estimated body mass index is 22.6 kg/m² as calculated from the following:    Height as of this encounter: 167.6 cm (66\").    Weight as of 5/22/23: 63.5 kg (140 lb).       BMI is within normal parameters. No other follow-up for BMI required.      Physical Exam  Vitals and nursing note reviewed.   Constitutional:       General: She is not in acute distress.     Appearance: She is well-developed. She is not ill-appearing or diaphoretic.   HENT:      Head: Normocephalic and atraumatic.   Eyes:      General:         Right eye: No discharge.         Left eye: No discharge.      Conjunctiva/sclera: Conjunctivae normal.   Cardiovascular:      Rate and Rhythm: Normal rate and regular rhythm.      Heart sounds: " Normal heart sounds.   Pulmonary:      Effort: Pulmonary effort is normal.      Breath sounds: Normal breath sounds.   Abdominal:      General: Bowel sounds are normal. There is no distension.      Palpations: Abdomen is soft. There is no mass.      Tenderness: There is no abdominal tenderness. There is no guarding or rebound.   Musculoskeletal:         General: No deformity.      Cervical back: Neck supple.      Comments: Gait smooth and steady   Lymphadenopathy:      Cervical: No cervical adenopathy.   Skin:     General: Skin is warm and dry.   Neurological:      Mental Status: She is alert and oriented to person, place, and time.   Psychiatric:         Mood and Affect: Mood normal.         Behavior: Behavior normal.        Result Review :                   Assessment and Plan   Diagnoses and all orders for this visit:    1. Right upper quadrant pain (Primary)  -     dicyclomine (BENTYL) 10 MG capsule; Take 1 capsule by mouth 4 (Four) Times a Day Before Meals & at Bedtime.  Dispense: 60 capsule; Refill: 0    2. Epigastric pain    3. Nausea  -     ondansetron ODT (ZOFRAN-ODT) 4 MG disintegrating tablet; Place 1 tablet on the tongue Every 8 (Eight) Hours As Needed for Nausea or Vomiting.  Dispense: 20 tablet; Refill: 0    Reviewed ER visit notes with CT scan that was negative for acute findings other than possible right ovarian cyst but I do not think this is causing right upper quadrant pain.  She does have an eval with GYN in next couple days which I do recommend she keep.  Also the Bentyl has seemed to help IBS symptoms and pain and I do not think that if her pain was from an ovarian cyst that this would be very helpful.  I will continue Zofran as needed.  Recommend reaching out to GI and to surgeon to see what next steps are for continued eval.  If worsening in meantime, she will go to ER.        Follow Up   Return if symptoms worsen or fail to improve.  Patient was given instructions and counseling regarding  her condition or for health maintenance advice. Please see specific information pulled into the AVS if appropriate.

## 2023-05-26 NOTE — TELEPHONE ENCOUNTER
"  Caller: Aggie Mitchell \"Ana\"    Relationship: Self    Best call back number: [phone]    What is the best time to reach you: ANY     Who are you requesting to speak with (clinical staff, provider,  specific staff member): CLINICAL STAFF     Do you know the name of the person who called: NOT SURE     What was the call regarding: PATIENT WAS RETURNING CALL TO OFFICE, SAID SHE HAD A VOICEMAIL.     Do you require a callback: YES           "

## 2023-05-29 ENCOUNTER — HOSPITAL ENCOUNTER (EMERGENCY)
Facility: HOSPITAL | Age: 23
Discharge: HOME | End: 2023-05-29
Attending: EMERGENCY MEDICINE | Admitting: EMERGENCY MEDICINE

## 2023-05-29 VITALS
WEIGHT: 140 LBS | TEMPERATURE: 98 F | BODY MASS INDEX: 22.5 KG/M2 | RESPIRATION RATE: 12 BRPM | DIASTOLIC BLOOD PRESSURE: 86 MMHG | SYSTOLIC BLOOD PRESSURE: 111 MMHG | HEART RATE: 117 BPM | HEIGHT: 66 IN | OXYGEN SATURATION: 96 %

## 2023-05-29 DIAGNOSIS — R53.83 FATIGUE, UNSPECIFIED TYPE: Primary | ICD-10-CM

## 2023-05-29 DIAGNOSIS — K52.9 CHRONIC DIARRHEA: ICD-10-CM

## 2023-05-29 DIAGNOSIS — R10.9 FLANK PAIN: ICD-10-CM

## 2023-05-29 LAB
ALBUMIN SERPL-MCNC: 4.4 G/DL (ref 3.5–5.2)
ALBUMIN/GLOB SERPL: 1.8 G/DL
ALP SERPL-CCNC: 76 U/L (ref 39–117)
ALT SERPL W P-5'-P-CCNC: 16 U/L (ref 1–33)
ANION GAP SERPL CALCULATED.3IONS-SCNC: 8.7 MMOL/L (ref 5–15)
AST SERPL-CCNC: 13 U/L (ref 1–32)
BACTERIA UR QL AUTO: ABNORMAL /HPF
BASOPHILS # BLD AUTO: 0.04 10*3/MM3 (ref 0–0.2)
BASOPHILS NFR BLD AUTO: 1 % (ref 0–1.5)
BILIRUB SERPL-MCNC: 0.4 MG/DL (ref 0–1.2)
BILIRUB UR QL STRIP: NEGATIVE
BUN SERPL-MCNC: 6 MG/DL (ref 6–20)
BUN/CREAT SERPL: 11.8 (ref 7–25)
CALCIUM SPEC-SCNC: 9.6 MG/DL (ref 8.6–10.5)
CHLORIDE SERPL-SCNC: 107 MMOL/L (ref 98–107)
CLARITY UR: CLEAR
CO2 SERPL-SCNC: 24.3 MMOL/L (ref 22–29)
COLOR UR: YELLOW
CREAT SERPL-MCNC: 0.51 MG/DL (ref 0.57–1)
DEPRECATED RDW RBC AUTO: 38.6 FL (ref 37–54)
EGFRCR SERPLBLD CKD-EPI 2021: 133.9 ML/MIN/1.73
EOSINOPHIL # BLD AUTO: 0.03 10*3/MM3 (ref 0–0.4)
EOSINOPHIL NFR BLD AUTO: 0.8 % (ref 0.3–6.2)
ERYTHROCYTE [DISTWIDTH] IN BLOOD BY AUTOMATED COUNT: 11.8 % (ref 12.3–15.4)
GLOBULIN UR ELPH-MCNC: 2.5 GM/DL
GLUCOSE SERPL-MCNC: 91 MG/DL (ref 65–99)
GLUCOSE UR STRIP-MCNC: NEGATIVE MG/DL
HCG SERPL QL: NEGATIVE
HCT VFR BLD AUTO: 39.8 % (ref 34–46.6)
HETEROPH AB SER QL LA: NEGATIVE
HGB BLD-MCNC: 13.9 G/DL (ref 12–15.9)
HGB UR QL STRIP.AUTO: NEGATIVE
HYALINE CASTS UR QL AUTO: ABNORMAL /LPF
IMM GRANULOCYTES # BLD AUTO: 0 10*3/MM3 (ref 0–0.05)
IMM GRANULOCYTES NFR BLD AUTO: 0 % (ref 0–0.5)
KETONES UR QL STRIP: NEGATIVE
LEUKOCYTE ESTERASE UR QL STRIP.AUTO: ABNORMAL
LIPASE SERPL-CCNC: 25 U/L (ref 13–60)
LYMPHOCYTES # BLD AUTO: 1.51 10*3/MM3 (ref 0.7–3.1)
LYMPHOCYTES NFR BLD AUTO: 38 % (ref 19.6–45.3)
MCH RBC QN AUTO: 31.1 PG (ref 26.6–33)
MCHC RBC AUTO-ENTMCNC: 34.9 G/DL (ref 31.5–35.7)
MCV RBC AUTO: 89 FL (ref 79–97)
MONOCYTES # BLD AUTO: 0.25 10*3/MM3 (ref 0.1–0.9)
MONOCYTES NFR BLD AUTO: 6.3 % (ref 5–12)
NEUTROPHILS NFR BLD AUTO: 2.14 10*3/MM3 (ref 1.7–7)
NEUTROPHILS NFR BLD AUTO: 53.9 % (ref 42.7–76)
NITRITE UR QL STRIP: NEGATIVE
NRBC BLD AUTO-RTO: 0 /100 WBC (ref 0–0.2)
PH UR STRIP.AUTO: 7.5 [PH] (ref 5–8)
PLATELET # BLD AUTO: 235 10*3/MM3 (ref 140–450)
PMV BLD AUTO: 9.3 FL (ref 6–12)
POTASSIUM SERPL-SCNC: 4 MMOL/L (ref 3.5–5.2)
PROT SERPL-MCNC: 6.9 G/DL (ref 6–8.5)
PROT UR QL STRIP: NEGATIVE
RBC # BLD AUTO: 4.47 10*6/MM3 (ref 3.77–5.28)
RBC # UR STRIP: ABNORMAL /HPF
REF LAB TEST METHOD: ABNORMAL
SODIUM SERPL-SCNC: 140 MMOL/L (ref 136–145)
SP GR UR STRIP: 1.01 (ref 1–1.03)
SQUAMOUS #/AREA URNS HPF: ABNORMAL /HPF
T4 FREE SERPL-MCNC: 1.38 NG/DL (ref 0.93–1.7)
TSH SERPL DL<=0.05 MIU/L-ACNC: 0.97 UIU/ML (ref 0.27–4.2)
UROBILINOGEN UR QL STRIP: ABNORMAL
WBC # UR STRIP: ABNORMAL /HPF
WBC NRBC COR # BLD: 3.97 10*3/MM3 (ref 3.4–10.8)

## 2023-05-29 PROCEDURE — 99284 EMERGENCY DEPT VISIT MOD MDM: CPT

## 2023-05-29 PROCEDURE — 87798 DETECT AGENT NOS DNA AMP: CPT | Performed by: PHYSICIAN ASSISTANT

## 2023-05-29 PROCEDURE — 86618 LYME DISEASE ANTIBODY: CPT | Performed by: PHYSICIAN ASSISTANT

## 2023-05-29 PROCEDURE — 81001 URINALYSIS AUTO W/SCOPE: CPT | Performed by: PHYSICIAN ASSISTANT

## 2023-05-29 PROCEDURE — 83690 ASSAY OF LIPASE: CPT | Performed by: PHYSICIAN ASSISTANT

## 2023-05-29 PROCEDURE — 96374 THER/PROPH/DIAG INJ IV PUSH: CPT

## 2023-05-29 PROCEDURE — 84439 ASSAY OF FREE THYROXINE: CPT | Performed by: PHYSICIAN ASSISTANT

## 2023-05-29 PROCEDURE — 84703 CHORIONIC GONADOTROPIN ASSAY: CPT | Performed by: PHYSICIAN ASSISTANT

## 2023-05-29 PROCEDURE — 84443 ASSAY THYROID STIM HORMONE: CPT | Performed by: PHYSICIAN ASSISTANT

## 2023-05-29 PROCEDURE — 36415 COLL VENOUS BLD VENIPUNCTURE: CPT

## 2023-05-29 PROCEDURE — 80053 COMPREHEN METABOLIC PANEL: CPT | Performed by: PHYSICIAN ASSISTANT

## 2023-05-29 PROCEDURE — 85025 COMPLETE CBC W/AUTO DIFF WBC: CPT | Performed by: PHYSICIAN ASSISTANT

## 2023-05-29 PROCEDURE — 86308 HETEROPHILE ANTIBODY SCREEN: CPT | Performed by: EMERGENCY MEDICINE

## 2023-05-29 PROCEDURE — 87468 ANAPLSMA PHGCYTOPHLM AMP PRB: CPT | Performed by: PHYSICIAN ASSISTANT

## 2023-05-29 PROCEDURE — 87484 EHRLICHA CHAFFEENSIS AMP PRB: CPT | Performed by: PHYSICIAN ASSISTANT

## 2023-05-29 PROCEDURE — 25010000002 KETOROLAC TROMETHAMINE PER 15 MG: Performed by: PHYSICIAN ASSISTANT

## 2023-05-29 RX ORDER — METHOCARBAMOL 750 MG/1
750 TABLET, FILM COATED ORAL ONCE
Status: COMPLETED | OUTPATIENT
Start: 2023-05-29 | End: 2023-05-29

## 2023-05-29 RX ORDER — KETOROLAC TROMETHAMINE 15 MG/ML
15 INJECTION, SOLUTION INTRAMUSCULAR; INTRAVENOUS ONCE
Status: COMPLETED | OUTPATIENT
Start: 2023-05-29 | End: 2023-05-29

## 2023-05-29 RX ORDER — DOXYCYCLINE 100 MG/1
100 CAPSULE ORAL 2 TIMES DAILY
Qty: 28 CAPSULE | Refills: 0 | Status: SHIPPED | OUTPATIENT
Start: 2023-05-29 | End: 2023-06-12

## 2023-05-29 RX ADMIN — KETOROLAC TROMETHAMINE 15 MG: 15 INJECTION, SOLUTION INTRAMUSCULAR; INTRAVENOUS at 12:48

## 2023-05-29 RX ADMIN — METHOCARBAMOL TABLETS 750 MG: 750 TABLET, COATED ORAL at 12:51

## 2023-05-29 NOTE — ED PROVIDER NOTES
Pt presents to the ED c/o  fatigue, abdominal pain/right upper quadrant pain, lethargy.  Chronic diarrhea unchanged from baseline.  Continues to eat and drink at baseline.  No fevers, chills, cough, chest pain, shortness of breath.  No nausea or vomiting.  Job does put her at high tick exposure and reports several tick bites over the last several months.  Denies any specific rash.  Has had arthralgias and myalgias for years she states.  Right flank pain worse with positional change and movement.  Seems relieved with rest.  Had a negative HIDA scan recently.      On exam,   General: No acute distress, nontoxic  HEENT: Mucous membranes moist, atraumatic, EOMI  Neck: Full ROM  Pulm: Symmetric chest rise, nonlabored, lungs CTAB  Cardiovascular: Regular rate and rhythm, intact distal pulses  GI: Soft, nontender, nondistended, no rebound, no guarding, bowel sounds present  MSK: Full ROM, no deformity  Skin: Warm, dry  Neuro: Awake, alert, oriented x 4, GCS 15, moving all extremities, no focal deficits  Psych: Calm, cooperative        Plan:   ED Course as of 05/29/23 1344   Mon May 29, 2023   1344 WBC: 3.97 [DC]   1344 Hemoglobin: 13.9 [DC]   1344 Lipase: 25 [DC]   1344 HCG Qualitative: Negative [DC]   1344 Free T4: 1.38 [DC]   1344 Monospot: Negative [DC]   1344 TSH Baseline: 0.970 [DC]   1344 Glucose: 91 [DC]   1344 BUN: 6 [DC]   1344 Creatinine(!): 0.51 [DC]   1344 Sodium: 140 [DC]   1344 Potassium: 4.0 [DC]   1344 ALT (SGPT): 16 [DC]   1344 AST (SGOT): 13 [DC]   1344 Alkaline Phosphatase: 76 [DC]   1344 Total Bilirubin: 0.4 [DC]   1344 Nitrite, UA: Negative [DC]   1344 Leukocytes, UA(!): Small (1+) [DC]   1344 Blood, UA: Negative [DC]   1344 Ketones, UA: Negative [DC]      ED Course User Index  [DC] Spencer Hicks MD     Some consideration for tickborne disease, I will start her on doxycycline until results come back.  She will follow-up in the outpatient setting with her PCP and GI team.  ED return for worsening  symptoms as needed.  She is overall with reassuring vital signs and no acute emergent lab abnormalities.  All questions and concerns addressed.     Diagnosis Plan   1. Fatigue, unspecified type        2. Flank pain        3. Chronic diarrhea          DISCHARGE    Patient discharged in stable condition.    Reviewed implications of results, diagnosis, meds, responsibility to follow up, warning signs and symptoms of possible worsening, potential complications and reasons to return to ER. If your blood pressure > 120/80 please follow up with your primary care provider for further management.    Patient/Family voiced understanding of above instructions.    Discussed plan for discharge, as there is no emergent indication for admission. Pt/family is agreeable and understands need for follow up and repeat testing.  Pt is aware that discharge does not mean that nothing is wrong but it indicates no emergency is present that requires admission and they must continue care with follow-up as given below or physician of their choice.     FOLLOW-UP  No follow-up provider specified.       Medication List      New Prescriptions    doxycycline 100 MG capsule  Commonly known as: MONODOX  Take 1 capsule by mouth 2 (Two) Times a Day for 14 days.           Where to Get Your Medications      These medications were sent to [id number] - [address] AT [facility] [address] - [phone]  - [phone] [address] [address]    Phone: [phone]   · doxycycline 100 MG capsule              Attestation:  The NADER and I have discussed this patient's history, physical exam, and treatment plan.  I have reviewed the documentation and personally had a face to face interaction with the patient. I affirm the documentation and agree with the treatment and plan.  The attached note describes my personal findings.            Spencer Hicks MD  05/29/23 7786

## 2023-05-29 NOTE — ED PROVIDER NOTES
EMERGENCY DEPARTMENT ENCOUNTER    Room Number:  25/25  Date seen:  5/29/2023  PCP: Marion Jaquez APRN    Spoken Language:  English  Language interpretation services not needed     HPI:  Chief Complaint: right upper quadrant pain    A complete HPI/ROS/PMH/PSH/SH/FH are unobtainable due to: n/a    Context: Aggie Mitchell is a 22 y.o. female presenting to the emergency department complaining of persistent right upper quadrant pain.  The history is being obtained by the patient, two friends and by review of the medical chart.  The patient states that she has been having right upper quadrant abdominal pain for 2 to 3 weeks.  She has an additional complaint of extreme fatigue.  Her friends, who are at bedside, states that they have been watching over the patient and that she has been sleeping the majority of the past 2-3 days.  The patient denies fever, cough, sore throat, chest pain, shortness of breath, or vomiting.  She states that she has diarrhea which is chronic and unchanged in nature.  She was recently prescribed Bentyl, which she states did help to improve her pain.  She states that her pain is increased after she eats certain foods, but cannot specifically elaborate on what those foods are.  She states that she has had an EGD which does demonstrate gastritis.  She has recently had an abdominal/pelvic CT scan which only showed a small right ovarian cyst. She had a normal outpatient HIDA scan.     The patient has an upcoming appointment with her OB/GYN tomorrow.  She has an upcoming appointment with her primary care provider on 06/20/2023.    Medical Records Review:  Patient was seen in the emergency department on 05/22/2023 for evaluation of right upper quadrant pain with nausea.  She had lab work which did not demonstrate any significant abnormalities.  She had a CT scan of the abdomen and pelvis which demonstrated a possible right ovarian cyst.  She had no evidence of acute cholecystitis, acute  appendicitis or ureteral stone.    The patient underwent an outpatient HIDA scan on 05/24/2023 which was normal.    She followed up at her primary care office on 05/26/2023.  She was given a prescription for Bentyl and Zofran.  She was referred for an OB/GYN appointment regarding the ovarian cyst.  She was also referred to general surgery and gastroenterology.    PAST MEDICAL HISTORY  Active Ambulatory Problems     Diagnosis Date Noted   • Vaginal pain 08/20/2019   • PMS (premenstrual syndrome) 08/20/2019   • Diarrhea 11/01/2022   • Nausea and vomiting 11/01/2022   • Right upper quadrant abdominal pain 11/01/2022   • Left lower quadrant abdominal pain 11/01/2022   • Abnormal CT of the abdomen 11/01/2022     Resolved Ambulatory Problems     Diagnosis Date Noted   • No Resolved Ambulatory Problems     Past Medical History:   Diagnosis Date   • IBS (irritable bowel syndrome)        PAST SURGICAL HISTORY  Past Surgical History:   Procedure Laterality Date   • COLONOSCOPY N/A 12/21/2022    Procedure: COLONOSCOPY FOR SCREENING with Polypectomy;  Surgeon: Shereen Bone DO;  Location: [facility] MAIN OR;  Service: Gastroenterology;  Laterality: N/A;  Rectal Polyp with biopsy forceps   • ENDOSCOPY N/A 12/21/2022    Procedure: ESOPHAGOGASTRODUODENOSCOPY;  Surgeon: Shereen Bone DO;  Location: [facility] MAIN OR;  Service: Gastroenterology;  Laterality: N/A;  Gastritis, Duodenitis   • TEAR DUCT SURGERY Bilateral    • UPPER GASTROINTESTINAL ENDOSCOPY     • WISDOM TOOTH EXTRACTION         FAMILY HISTORY  Family History   Problem Relation Age of Onset   • Irritable bowel syndrome Paternal Aunt    • Colon polyps Paternal Uncle    • Colon cancer Paternal Grandmother    • Crohn's disease Neg Hx    • Ulcerative colitis Neg Hx        SOCIAL HISTORY  Social History     Socioeconomic History   • Marital status: Single   Tobacco Use   • Smoking status: Former   • Smokeless tobacco: Never   Vaping Use   • Vaping Use: Never used    Substance and Sexual Activity   • Alcohol use: Never   • Drug use: Never   • Sexual activity: Yes     Partners: Male       ALLERGIES  Patient has no known allergies.    REVIEW OF SYSTEMS  All systems reviewed and negative except for those discussed in HPI.     PHYSICAL EXAM  ED Triage Vitals [05/29/23 1116]   Temp Heart Rate Resp BP SpO2   98 °F (36.7 °C) 98 16 -- 98 %      Temp src Heart Rate Source Patient Position BP Location FiO2 (%)   Tympanic Monitor -- -- --       Physical Exam  Constitutional:       Appearance: Normal appearance.   HENT:      Head: Normocephalic and atraumatic.      Mouth/Throat:      Mouth: Mucous membranes are moist.   Eyes:      Extraocular Movements: Extraocular movements intact.      Pupils: Pupils are equal, round, and reactive to light.   Cardiovascular:      Rate and Rhythm: Normal rate and regular rhythm.   Pulmonary:      Effort: Pulmonary effort is normal.      Breath sounds: Normal breath sounds.   Abdominal:      General: There is no distension.      Palpations: Abdomen is soft.      Tenderness: There is abdominal tenderness in the right upper quadrant and epigastric area. There is no right CVA tenderness or left CVA tenderness.   Musculoskeletal:        Arms:       Cervical back: Normal range of motion and neck supple.   Neurological:      Mental Status: She is alert and oriented to person, place, and time. Mental status is at baseline.   Psychiatric:         Mood and Affect: Mood normal.         Behavior: Behavior normal.         Thought Content: Thought content normal.         Judgment: Judgment normal.         LAB RESULTS  Recent Results (from the past 24 hour(s))   Urinalysis With Microscopic If Indicated (No Culture) - Urine, Clean Catch    Collection Time: 05/29/23 11:51 AM    Specimen: Urine, Clean Catch   Result Value Ref Range    Color, UA Yellow Yellow, Straw    Appearance, UA Clear Clear    pH, UA 7.5 5.0 - 8.0    Specific Gravity, UA 1.009 1.005 - 1.030     Glucose, UA Negative Negative    Ketones, UA Negative Negative    Bilirubin, UA Negative Negative    Blood, UA Negative Negative    Protein, UA Negative Negative    Leuk Esterase, UA Small (1+) (A) Negative    Nitrite, UA Negative Negative    Urobilinogen, UA 1.0 E.U./dL 0.2 - 1.0 E.U./dL   Urinalysis, Microscopic Only - Urine, Clean Catch    Collection Time: 05/29/23 11:51 AM    Specimen: Urine, Clean Catch   Result Value Ref Range    RBC, UA 0-2 None Seen, 0-2 /HPF    WBC, UA 3-5 (A) None Seen, 0-2 /HPF    Bacteria, UA None Seen None Seen /HPF    Squamous Epithelial Cells, UA 3-6 (A) None Seen, 0-2 /HPF    Hyaline Casts, UA 0-2 None Seen /LPF    Methodology Automated Microscopy    Comprehensive Metabolic Panel    Collection Time: 05/29/23 12:03 PM    Specimen: Blood   Result Value Ref Range    Glucose 91 65 - 99 mg/dL    BUN 6 6 - 20 mg/dL    Creatinine 0.51 (L) 0.57 - 1.00 mg/dL    Sodium 140 136 - 145 mmol/L    Potassium 4.0 3.5 - 5.2 mmol/L    Chloride 107 98 - 107 mmol/L    CO2 24.3 22.0 - 29.0 mmol/L    Calcium 9.6 8.6 - 10.5 mg/dL    Total Protein 6.9 6.0 - 8.5 g/dL    Albumin 4.4 3.5 - 5.2 g/dL    ALT (SGPT) 16 1 - 33 U/L    AST (SGOT) 13 1 - 32 U/L    Alkaline Phosphatase 76 39 - 117 U/L    Total Bilirubin 0.4 0.0 - 1.2 mg/dL    Globulin 2.5 gm/dL    A/G Ratio 1.8 g/dL    BUN/Creatinine Ratio 11.8 7.0 - 25.0    Anion Gap 8.7 5.0 - 15.0 mmol/L    eGFR 133.9 >60.0 mL/min/1.73   Lipase    Collection Time: 05/29/23 12:03 PM    Specimen: Blood   Result Value Ref Range    Lipase 25 13 - 60 U/L   hCG, Serum, Qualitative    Collection Time: 05/29/23 12:03 PM    Specimen: Blood   Result Value Ref Range    HCG Qualitative Negative Negative   TSH    Collection Time: 05/29/23 12:03 PM    Specimen: Blood   Result Value Ref Range    TSH 0.970 0.270 - 4.200 uIU/mL   T4, Free    Collection Time: 05/29/23 12:03 PM    Specimen: Blood   Result Value Ref Range    Free T4 1.38 0.93 - 1.70 ng/dL   CBC Auto Differential     Collection Time: 05/29/23 12:03 PM    Specimen: Blood   Result Value Ref Range    WBC 3.97 3.40 - 10.80 10*3/mm3    RBC 4.47 3.77 - 5.28 10*6/mm3    Hemoglobin 13.9 12.0 - 15.9 g/dL    Hematocrit 39.8 34.0 - 46.6 %    MCV 89.0 79.0 - 97.0 fL    MCH 31.1 26.6 - 33.0 pg    MCHC 34.9 31.5 - 35.7 g/dL    RDW 11.8 (L) 12.3 - 15.4 %    RDW-SD 38.6 37.0 - 54.0 fl    MPV 9.3 6.0 - 12.0 fL    Platelets 235 140 - 450 10*3/mm3    Neutrophil % 53.9 42.7 - 76.0 %    Lymphocyte % 38.0 19.6 - 45.3 %    Monocyte % 6.3 5.0 - 12.0 %    Eosinophil % 0.8 0.3 - 6.2 %    Basophil % 1.0 0.0 - 1.5 %    Immature Grans % 0.0 0.0 - 0.5 %    Neutrophils, Absolute 2.14 1.70 - 7.00 10*3/mm3    Lymphocytes, Absolute 1.51 0.70 - 3.10 10*3/mm3    Monocytes, Absolute 0.25 0.10 - 0.90 10*3/mm3    Eosinophils, Absolute 0.03 0.00 - 0.40 10*3/mm3    Basophils, Absolute 0.04 0.00 - 0.20 10*3/mm3    Immature Grans, Absolute 0.00 0.00 - 0.05 10*3/mm3    nRBC 0.0 0.0 - 0.2 /100 WBC   Mononucleosis Screen    Collection Time: 05/29/23 12:03 PM    Specimen: Blood   Result Value Ref Range    Monospot Negative Negative       RADIOLOGY  Imaging Results (Last 24 Hours)     ** No results found for the last 24 hours. **        PROCEDURES  Procedures  None    MEDICATIONS GIVEN IN ER  Medications   ketorolac (TORADOL) injection 15 mg (15 mg Intravenous Given 5/29/23 1248)   methocarbamol (ROBAXIN) tablet 750 mg (750 mg Oral Given 5/29/23 1251)     MEDICAL DECISION MAKING, PROGRESS, and CONSULTS  Vital signs and nursing notes have all been reviewed by me.    All laboratory results have been independently interpreted by me.      Orders placed during this visit:  Orders Placed This Encounter   Procedures   • Comprehensive Metabolic Panel   • Lipase   • Urinalysis With Microscopic If Indicated (No Culture) - Urine, Clean Catch   • hCG, Serum, Qualitative   • TSH   • T4, Free   • CBC Auto Differential   • Mononucleosis Screen   • Urinalysis, Microscopic Only - Urine, Clean  Catch   • Ehrlichia Profile DNA PCR   • Rickettsia Species DNA, Real-Time PCR   • Lyme Disease Total Antibody With Reflex to Immunoassay   • Orthostatic Vitals   • CBC & Differential     Differential diagnosis includes but is not limited to:  Differential diagnosis includes but is not limited to:  - Hepatobiliary pathology such as cholecystitis, cholangitis, and symptomatic cholelithiasis  - Pancreatitis  - Dyspepsia  - Small bowel obstruction  - Appendicitis  - Diverticulitis  - UTI including pyelonephritis  - Ureteral stone  - Zoster  - Colitis, including infectious and ischemic  - Atypical ACS  - muscle spasms    Discussion below represents my analysis of pertinent findings related to patient's condition, differential diagnosis, treatment plan and final disposition:    The patient has a reassuring work-up in the ED. She has upcoming appointments with OB/GYN and PCP. Her tick panel is pending and may take up to 2 days to result. She will be prescribed doxycycline prophylactically.    ED Course as of 05/29/23 1402   Mon May 29, 2023   1344 WBC: 3.97 [DC]   1344 Hemoglobin: 13.9 [DC]   1344 Lipase: 25 [DC]   1344 HCG Qualitative: Negative [DC]   1344 Free T4: 1.38 [DC]   1344 Monospot: Negative [DC]   1344 TSH Baseline: 0.970 [DC]   1344 Glucose: 91 [DC]   1344 BUN: 6 [DC]   1344 Creatinine(!): 0.51 [DC]   1344 Sodium: 140 [DC]   1344 Potassium: 4.0 [DC]   1344 ALT (SGPT): 16 [DC]   1344 AST (SGOT): 13 [DC]   1344 Alkaline Phosphatase: 76 [DC]   1344 Total Bilirubin: 0.4 [DC]   1344 Nitrite, UA: Negative [DC]   1344 Leukocytes, UA(!): Small (1+) [DC]   1344 Blood, UA: Negative [DC]   1344 Ketones, UA: Negative [DC]      ED Course User Index  [DC] Spencer Hicks MD          Additional sources:  - Discussed/ obtained information from independent historians: patient's 2 friends    - External (non-ED) record review: previous PCP and ED notes    - Chronic or social conditions impacting care: patient is estranged from  her family    - Shared decision making: I discussed ED evaluation and treatment plan with patient who is in agreement.  Discussed at length ED testing.     DIAGNOSIS  Final diagnoses:   Fatigue, unspecified type   Flank pain   Chronic diarrhea       DISPOSITION  ED Disposition     ED Disposition   Discharge    Condition   Stable    Comment   --             FOLLOW UP  [facility] Emergency Department  [address]  [facility] [address] [phone]    As needed, If symptoms worsen    Marion Jaquez, APRN  [address]  [address]  [place] [id number] [phone]    Schedule an appointment as soon as possible for a visit   As needed      RX  Medications   ketorolac (TORADOL) injection 15 mg (15 mg Intravenous Given 5/29/23 1248)   methocarbamol (ROBAXIN) tablet 750 mg (750 mg Oral Given 5/29/23 1251)          Medication List      New Prescriptions    doxycycline 100 MG capsule  Commonly known as: MONODOX  Take 1 capsule by mouth 2 (Two) Times a Day for 14 days.           Where to Get Your Medications      These medications were sent to [id number] - [place], [place] - [address] [place] [address] AT SEC OF [address] - [phone]  - [phone] [address], [facility] [address]    Phone: [phone]   · doxycycline 100 MG capsule         Latest Documented Vital Signs:  As of 14:02 EDT  BP- 111/86 HR- 117 Temp- 98 °F (36.7 °C) (Tympanic) O2 sat- 96%    Provider Attestation:  I personally reviewed the past medical history, past surgical history, social history, family history, current medications and allergies as they appear in the chart. I reviewed the patient's history, physical, lab/imaging results and overall care with Dr. Hicks who is in agreement with the patient's treatment plan.    EMR Dragon/Transcription disclaimer:  Dictated using Dragon dictation    Provider note signed by:    Note Disclaimer: At [facility], we believe that sharing  information builds trust and better relationships. You are receiving this note because you are receiving care at [facility] or recently visited. It is possible you will see health information before a provider has talked with you about it. This kind of information can be easy to misunderstand. To help you fully understand what it means for your health, we urge you to discuss this note with your provider.       Anh Schroeder PA  05/29/23 [id number]

## 2023-05-29 NOTE — DISCHARGE INSTRUCTIONS
Take course of antibiotics, follow-up on results of your tick bite panel, close PCP follow-up for recheck within 1 week, ED return for worsening symptoms as needed.

## 2023-05-29 NOTE — ED NOTES
Patient c/o lethargy and RUQ pain for 3 weeks. Patient states she was seen in the ED earlier last week and had a CT scan that showed an ovarian cyst. Pt states that her pain and lethargy has increased dramatically.

## 2023-05-29 NOTE — ED NOTES
She has abd pain - seen last Monday and f/u c pmd Friday for same.  She c/o joint pain.  She c/o bilat flank pain.  She has had urinary symptoms and has been constipated

## 2023-05-31 ENCOUNTER — TELEPHONE (OUTPATIENT)
Dept: GASTROENTEROLOGY | Facility: CLINIC | Age: 23
End: 2023-05-31
Payer: COMMERCIAL

## 2023-05-31 LAB — B BURGDOR IGG+IGM SER QL IA: NEGATIVE

## 2023-05-31 NOTE — TELEPHONE ENCOUNTER
- Please schedule her to see me an a new patient this Monday, 6/5/23 at 8-9 am in the office. Please schedule this with her POA, Nuha Hernandez: [phone]. [email or web address]

## 2023-06-02 ENCOUNTER — PATIENT OUTREACH (OUTPATIENT)
Dept: CASE MANAGEMENT | Facility: OTHER | Age: 23
End: 2023-06-02

## 2023-06-02 NOTE — OUTREACH NOTE
"AMBULATORY CASE MANAGEMENT NOTE    Name and Relationship of Patient/Support Person: Aggie Mitchell \"Ana\" - Self    Adult Patient Profile  Questions/Answers    Flowsheet Row Most Recent Value   Symptoms/Conditions Managed at Home other (see comments)  [fatigue and pain]      Patient Outreach    Introduced self, explained ACM RN role and provided contact information. Spoke with patient. She would like a rheumatology referral. PCP states in notes an autoimmune issue must be positive before a referral would be accepted. ACM to follow up with patient next week. She has several labs pending.     Education Documentation  No documentation found.        Harriett BECKER  Ambulatory Case Management    6/2/2023, 16:33 EDT  "

## 2023-06-04 LAB
A PHAGOCYTOPH DNA BLD QL NAA+PROBE: NEGATIVE
E CHAFFEENSIS DNA BLD QL NAA+PROBE: NEGATIVE
RICKETTSIA RICKETTSII DNA, RT: NOT DETECTED

## 2023-06-05 ENCOUNTER — OFFICE VISIT (OUTPATIENT)
Dept: GASTROENTEROLOGY | Facility: CLINIC | Age: 23
End: 2023-06-05
Payer: COMMERCIAL

## 2023-06-05 VITALS
DIASTOLIC BLOOD PRESSURE: 77 MMHG | TEMPERATURE: 97.7 F | HEIGHT: 66 IN | WEIGHT: 133.8 LBS | HEART RATE: 96 BPM | SYSTOLIC BLOOD PRESSURE: 116 MMHG | BODY MASS INDEX: 21.5 KG/M2 | OXYGEN SATURATION: 98 %

## 2023-06-05 DIAGNOSIS — R19.7 DIARRHEA, UNSPECIFIED TYPE: Primary | ICD-10-CM

## 2023-06-05 DIAGNOSIS — R10.9 CHRONIC ABDOMINAL PAIN: ICD-10-CM

## 2023-06-05 DIAGNOSIS — R10.11 RIGHT UPPER QUADRANT ABDOMINAL PAIN: ICD-10-CM

## 2023-06-05 DIAGNOSIS — G89.29 CHRONIC ABDOMINAL PAIN: ICD-10-CM

## 2023-06-05 DIAGNOSIS — M25.50 ARTHRALGIA, UNSPECIFIED JOINT: ICD-10-CM

## 2023-06-05 DIAGNOSIS — K59.00 CONSTIPATION, UNSPECIFIED CONSTIPATION TYPE: ICD-10-CM

## 2023-06-05 PROCEDURE — 99214 OFFICE O/P EST MOD 30 MIN: CPT | Performed by: INTERNAL MEDICINE

## 2023-06-05 NOTE — PROGRESS NOTES
"No chief complaint on file.      History of Present Illness:   22 y.o. female       In 9/22 she saw Dr. Fran Correa (he treated her \"enteritis\" with Xifaxan and had said that if she didn't get better he would do an EGD and colonoscopy) and has seen one of his NP's since then.     GI workup to date:  -On 12/21/22 at [facility] she had the following tests by Dr. DRISS Bone:       - EGD: Gastritis, duodenitis. Duodenal biopsies - benign       - Colonoscopy - rectal polyp - terminal ileal and rectal biopsies - benign with no inflammation  - 5/22/23: CT abd/pelvis with IV contrast:  IMPRESSION:       1.  A normal caliber appendix is noted along the posterior medial aspect   of the cecum.  No bowel obstruction.  2.  Trace layering subcentimeter pleural effusions noted in the posterior   costophrenic margins bilaterally.  3.  Nondominant follicular changes suggested involving the right   adnexa ovary.  The clinical significance of this finding is indeterminant   and this may be incidental.  - 5/24/23: Kinevac Hida scan - normal with a GB EF of 76%  - 7/11/22: CT abd/pelvis with IV contrast:  IMPRESSION:  There are some fluid-filled nonobstructed loops of small bowel within  the abdomen and pelvis. These are nonspecific but can be seen with  enteritis. Remainder of the study is unremarkable  - 10/3/21 - US of the gallbladder: normal  - 11/19: MRI of the pelvis: small anterior rectocoele.       C/o fatigue, dehydration, and cannot rest and has to not exercise as much. It may take several days to get over this. She may get confused. She may have RUQ abdominal pressure discomfort, which may make it difficult to sleep. She has constant joint pains (knees, wrist) but doesn't have this all the time. She isn't sure what will bring on this RUQ abd pain.       She may have bloating and sickness. She c/o diarrhea (x over one year) but Zofran has made her consitpated. Yesterday had no BM's. No rectal bleeding or melena. Some " nausea, vomiting in the last 2 weeks. She has lost weight.        She may get dysuria with urine cloudiness with mid abdominal pain. Saw a Urologist a few years ago. She may drink Cranberry Juice for this.        She gets mouth sores.        She overheats easily, and feels that she cannot move. 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 [facility] [email or web address] [employer] [employer].        LMP 3 weeks ago. . Sometimes her c/o are associated with her menses. NOt so much now. Nonsmoker. No ETOH. For 4 reji yrs in HS she took Cocaine, ETOH, OD'd on meds. Attempted suicide years ago.     Past Medical History:   Diagnosis Date    IBS (irritable bowel syndrome)        Past Surgical History:   Procedure Laterality Date    COLONOSCOPY N/A 2022    Procedure: COLONOSCOPY FOR SCREENING with Polypectomy;  Surgeon: Shereen Bone DO;  [place] [facility] [place];  Service: Gastroenterology;  Laterality: N/A;  Rectal Polyp with biopsy forceps    ENDOSCOPY N/A 2022    Procedure: ESOPHAGOGASTRODUODENOSCOPY;  Surgeon: Shereen Bone DO;  Location: [facility];  Service: Gastroenterology;  Laterality: N/A;  Gastritis, Duodenitis    TEAR DUCT SURGERY Bilateral     UPPER GASTROINTESTINAL ENDOSCOPY      WISDOM TOOTH EXTRACTION           Current Outpatient Medications:     dicyclomine (BENTYL) 10 MG capsule, Take 1 capsule by mouth 4 (Four) Times a Day Before Meals & at Bedtime., Disp: 60 capsule, Rfl: 0    doxycycline (MONODOX) 100 MG capsule, Take 1 capsule by mouth 2 (Two) Times a Day for 14 days., Disp: 28 capsule, Rfl: 0    ondansetron ODT (ZOFRAN-ODT) 4 MG disintegrating tablet, Place 1 tablet on the tongue Every 8 (Eight) Hours As Needed for Nausea or Vomiting., Disp: 20 tablet, Rfl: 0    No Known Allergies    Family History   Problem Relation Age of Onset    Irritable bowel syndrome Paternal Aunt     Colon polyps Paternal Uncle     Colon cancer Paternal Grandmother     Crohn's disease Neg  Hx     Ulcerative colitis Neg Hx        Social History     Socioeconomic History    Marital status: Single   Tobacco Use    Smoking status: Former    Smokeless tobacco: Never   Vaping Use    Vaping Use: Never used   Substance and Sexual Activity    Alcohol use: Never    Drug use: Never    Sexual activity: Yes     Partners: Male       Review of Systems   Gastrointestinal:  Positive for abdominal pain, constipation and diarrhea.   All other systems reviewed and are negative.  Pertinent positives and negatives documented in the HPI and all other systems reviewed and were found to be negative.  There were no vitals filed for this visit.    Physical Exam  Vitals reviewed.   Constitutional:       General: She is not in acute distress.     Appearance: Normal appearance. She is well-developed. She is not diaphoretic.   HENT:      Head: Normocephalic and atraumatic. Hair is normal.      Right Ear: Hearing, tympanic membrane, ear canal and external ear normal. No decreased hearing noted. No drainage.      Left Ear: Hearing, tympanic membrane, ear canal and external ear normal. No decreased hearing noted.      Nose: Nose normal. No nasal deformity.      Mouth/Throat:      Mouth: Mucous membranes are moist.   Eyes:      General: Lids are normal.         Right eye: No discharge.         Left eye: No discharge.      Extraocular Movements: Extraocular movements intact.      Conjunctiva/sclera: Conjunctivae normal.      Pupils: Pupils are equal, round, and reactive to light.   Neck:      Thyroid: No thyromegaly.      Vascular: No JVD.      Trachea: No tracheal deviation.   Cardiovascular:      Rate and Rhythm: Normal rate and regular rhythm.      Pulses: Normal pulses.      Heart sounds: Normal heart sounds. No murmur heard.    No friction rub. No gallop.   Pulmonary:      Effort: Pulmonary effort is normal. No respiratory distress.      Breath sounds: Normal breath sounds. No wheezing or rales.   Chest:      Chest wall: No  tenderness.   Abdominal:      General: Bowel sounds are normal. There is no distension.      Palpations: Abdomen is soft. There is no mass.      Tenderness: There is no abdominal tenderness. There is no guarding or rebound.      Hernia: No hernia is present.   Musculoskeletal:         General: No tenderness or deformity. Normal range of motion.      Cervical back: Normal range of motion and neck supple.   Lymphadenopathy:      Cervical: No cervical adenopathy.   Skin:     General: Skin is warm and dry.      Findings: No erythema or rash.   Neurological:      Mental Status: She is alert and oriented to person, place, and time.      Cranial Nerves: No cranial nerve deficit.      Motor: No abnormal muscle tone.      Coordination: Coordination normal.      Deep Tendon Reflexes: Reflexes are normal and symmetric. Reflexes normal.   Psychiatric:         Mood and Affect: Mood normal.         Behavior: Behavior normal.         Thought Content: Thought content normal.         Judgment: Judgment normal.       There are no diagnoses linked to this encounter.  Assessment:  1. Joint c/o  2. Fatigue with dehydration  3. RUQ abdominal pain.  4. Nausea, vomiting  5. WEight loss  6. H/o depression in past.  7. H/o diarrhea and constipation.  8.     Recommendations:  1. Pt to see a Rheumatologist  2. She should get a PCP.  3. CT abd/pelvis with enterography  4. Celiac sprue antibody panel.  5. Trial of fiber.   6. F/u 8 weeks.     No follow-ups on file.    Ru Pearson MD  6/5/2023

## 2023-06-06 ENCOUNTER — TELEPHONE (OUTPATIENT)
Dept: GASTROENTEROLOGY | Facility: CLINIC | Age: 23
End: 2023-06-06
Payer: COMMERCIAL

## 2023-06-06 ENCOUNTER — PATIENT OUTREACH (OUTPATIENT)
Dept: CASE MANAGEMENT | Facility: OTHER | Age: 23
End: 2023-06-06
Payer: COMMERCIAL

## 2023-06-06 DIAGNOSIS — R10.84 GENERALIZED ABDOMINAL PAIN: ICD-10-CM

## 2023-06-06 DIAGNOSIS — R53.83 FATIGUE, UNSPECIFIED TYPE: Primary | ICD-10-CM

## 2023-06-06 DIAGNOSIS — M25.50 ARTHRALGIA, UNSPECIFIED JOINT: ICD-10-CM

## 2023-06-06 LAB
GLIADIN PEPTIDE IGA SER-ACNC: 4 UNITS (ref 0–19)
GLIADIN PEPTIDE IGG SER-ACNC: 2 UNITS (ref 0–19)
IGA SERPL-MCNC: 149 MG/DL (ref 87–352)
TTG IGA SER-ACNC: <2 U/ML (ref 0–3)
TTG IGG SER-ACNC: <2 U/ML (ref 0–5)

## 2023-06-06 NOTE — TELEPHONE ENCOUNTER
----- Message from Aggie Mitchell sent at 6/5/2023  3:58 PM EDT -----  Regarding: Scheduling  Contact: [phone]  Hello! Thanks again for seeing me and working me in. Nice to meet you today. Your tech said you have no availability until september, you requested I come back to be seen in 8 weeks. She suggested I write you here to see about getting worked in 8 weeks from now or anytl other suggestions you might have. Thanks!

## 2023-06-06 NOTE — OUTREACH NOTE
"AMBULATORY CASE MANAGEMENT NOTE    Name and Relationship of Patient/Support Person: Aggie Mitchell \"Ana\" - Self    Patient Outreach    Introduced self, explained ACM RN role and provided contact information. Spoke with patient regarding requested rhumatology referral. PCP has ordered autoimmune labs to be completed prior to referral being ordered. ACM reviewed with patient she can complete at PCP office or any [facility] lab. Patient verbalized understanding. Sent 2 [facility] labs to patient via text that may be more convenient that the PCP office. Follow up scheduled next week to review and provide assistance if needed.     Education Documentation  No documentation found.        Harriett BECKER  Ambulatory Case Management    6/6/2023, 14:43 EDT  "

## 2023-06-06 NOTE — TELEPHONE ENCOUNTER
Tell her that the celiac sprue antibody labtest came back negative which means that you do not have celiac sprue now.        Let's see what the CT abd/pelvis with small bowel enterography shows.        I will be curious what the Rheumatologist has to say and what your PCP has to say.       We will see if the fiber supplement helps?

## 2023-06-12 DIAGNOSIS — R53.83 FATIGUE, UNSPECIFIED TYPE: Primary | ICD-10-CM

## 2023-06-12 DIAGNOSIS — R10.84 GENERALIZED ABDOMINAL PAIN: ICD-10-CM

## 2023-06-12 DIAGNOSIS — M25.50 ARTHRALGIA, UNSPECIFIED JOINT: ICD-10-CM

## 2023-06-13 ENCOUNTER — PATIENT OUTREACH (OUTPATIENT)
Dept: CASE MANAGEMENT | Facility: OTHER | Age: 23
End: 2023-06-13
Payer: COMMERCIAL

## 2023-06-13 NOTE — OUTREACH NOTE
"AMBULATORY CASE MANAGEMENT NOTE    Name and Relationship of Patient/Support Person: Aggie Mitchell \"Ana\" - Self    Patient Outreach    Introduced self, explained ACM RN role and provided contact information. Spoke with patient regarding health and wellness. Patient states she would like help finding a new OBGYN. AC provided patient with physician finder line: [phone]. Follow up scheduled in 3 weeks to review continued needs and SDOH survey and provide support. SDOH survey sent via [employer].     Education Documentation  No documentation found.        Harriett BECKER  Ambulatory Case Management    6/13/2023, 15:33 EDT  "

## 2023-07-12 ENCOUNTER — TELEPHONE (OUTPATIENT)
Dept: FAMILY MEDICINE CLINIC | Facility: CLINIC | Age: 23
End: 2023-07-12
Payer: COMMERCIAL

## 2023-07-26 ENCOUNTER — PATIENT OUTREACH (OUTPATIENT)
Dept: CASE MANAGEMENT | Facility: OTHER | Age: 23
End: 2023-07-26
Payer: COMMERCIAL

## 2023-07-26 NOTE — OUTREACH NOTE
AMBULATORY CASE MANAGEMENT NOTE    Name and Relationship of Patient/Support Person:  -     Care Coordination    Introduced self, explained ACM RN role and provided contact information. Spoke with Alanis at [facility]. [phone]. Patient can call to schedule an appointment. Dr. Whyte only provider taking insurance for reductions at this time. Appointments are as far out as October 2024.     Patient Outreach    Patient updated of appointment feedback from Plastics office. Message sent to PCP with request for other referral options as well. Follow up scheduled next week.     Education Documentation  No documentation found.        Harriett BECKER  Ambulatory Case Management    7/26/2023, 15:04 EDT

## 2023-08-04 ENCOUNTER — PATIENT OUTREACH (OUTPATIENT)
Dept: CASE MANAGEMENT | Facility: OTHER | Age: 23
End: 2023-08-04
Payer: COMMERCIAL

## 2023-08-14 ENCOUNTER — PATIENT OUTREACH (OUTPATIENT)
Dept: CASE MANAGEMENT | Facility: OTHER | Age: 23
End: 2023-08-14
Payer: COMMERCIAL

## 2023-08-14 NOTE — OUTREACH NOTE
"AMBULATORY CASE MANAGEMENT NOTE    Name and Relationship of Patient/Support Person: Aggie Mitchell \"Ana\" - Self    Patient Outreach    Introduced self, explained ACM RN role and provided contact information.  Spoke with patient regarding health and wellness. Patient plans to schedule appointment with PCP to review PT notes and recommendations. Patient plans to review alternate plastics referral options that might be able to get her in sooner than 14 months from now. Message also sent to PCP with this updated request as well. Transitioned patient to monitoring status. Will review progress next month.     Education Documentation  No documentation found.        Harriett BECKER  Ambulatory Case Management    8/14/2023, 17:00 EDT  "

## 2023-08-15 ENCOUNTER — TELEPHONE (OUTPATIENT)
Dept: GASTROENTEROLOGY | Facility: CLINIC | Age: 23
End: 2023-08-15
Payer: COMMERCIAL

## 2023-08-15 DIAGNOSIS — K59.00 CONSTIPATION, UNSPECIFIED CONSTIPATION TYPE: ICD-10-CM

## 2023-08-15 DIAGNOSIS — G89.29 CHRONIC ABDOMINAL PAIN: ICD-10-CM

## 2023-08-15 DIAGNOSIS — R63.4 WEIGHT LOSS: ICD-10-CM

## 2023-08-15 DIAGNOSIS — R19.7 DIARRHEA, UNSPECIFIED TYPE: Primary | ICD-10-CM

## 2023-08-15 DIAGNOSIS — R10.9 CHRONIC ABDOMINAL PAIN: ICD-10-CM

## 2023-08-15 DIAGNOSIS — R11.2 NAUSEA AND VOMITING, UNSPECIFIED VOMITING TYPE: ICD-10-CM

## 2023-08-15 NOTE — TELEPHONE ENCOUNTER
----- Message from Aggie Mitchell sent at 8/14/2023  9:03 PM EDT -----  Regarding: CT SCAN DENIED BY INSURANCE   Contact: [phone]  Tana. The CT scan you ordered has been denied by my insurance because similar scans or tests have recently been performed with no evidence of further testing needing to be done. They say they won't cover it unless some sort of test provides evidence for its need. I can't afford it without coverage. Do you have any suggestions?

## 2023-08-16 NOTE — TELEPHONE ENCOUNTER
"     So the CT abd/pelvis with enterography was denied by her insurance company. I was most interested in the \"enterography\" part of this to r/o crohn's disease of the small bowel? Since insurance won't agree to this then please order an MRI of the abdomen with enterography: \"h/o abdominal pain, nausea, vomiting, Weight loss, diarrhea and constiaption. Could she have crohn's disease?\" Please order this and I will cosign. If insurance denies this then have her come see me in the office and we can discuss. Thx.[facility]       Called pt and left  for pt to call back.   Mri with enterography ordered  "

## 2023-08-17 NOTE — TELEPHONE ENCOUNTER
Also, make sure that you tell the patient what we are doing.  Make sure that she knows that of ordered an MRI with enterography instead of the CAT scan since the insurance company refused the CAT scan.  I am looking for Crohn's disease of the small bowel, to rule that out. Per Dr Pearson.       Called pt and advised of Dr Pearson's note. Verb understanding.  Number to scheduling at [facility] given to pt.

## 2023-09-01 ENCOUNTER — OFFICE VISIT (OUTPATIENT)
Dept: FAMILY MEDICINE CLINIC | Facility: CLINIC | Age: 23
End: 2023-09-01
Payer: COMMERCIAL

## 2023-09-01 VITALS
OXYGEN SATURATION: 100 % | BODY MASS INDEX: 22.13 KG/M2 | DIASTOLIC BLOOD PRESSURE: 73 MMHG | WEIGHT: 137.7 LBS | HEART RATE: 98 BPM | TEMPERATURE: 97.7 F | SYSTOLIC BLOOD PRESSURE: 116 MMHG | HEIGHT: 66 IN

## 2023-09-01 DIAGNOSIS — R29.898 LEFT LEG WEAKNESS: Primary | ICD-10-CM

## 2023-09-01 PROCEDURE — 99214 OFFICE O/P EST MOD 30 MIN: CPT | Performed by: NURSE PRACTITIONER

## 2023-09-01 NOTE — PROGRESS NOTES
"Chief Complaint  Follow-up (F/u PT, PT had recommendations for pt) and Ear Fullness (C/o L ear fullness )    Subjective        Aggie Mitchell presents to [facility]  History of Present Illness  pt here with FM complaining of intermittent left leg numbness, tingling, and pain. Pt says PT recommended pt be evaluated for a neurological disorder such as ALS, MS because there is no etiology for her leg weakness.     Also, complaining of fullness and pressure in her left ear with intermittent ringing.  No ear pain.  No rhinitis, pharyngitis.     Pt has been evaluated by GI for chronic abd pain, fatigue that impacts all areas of life.  She has associated brain fog.  This has been ongoing for several yrs and has never had cause diagnosed.  Pt feels she likely has underlying autoimmune disorder, perhaps neurologic issue that has not been diagnosed causing her sx.  Would like to see neuro for eval and diagnosis.     Objective   Vital Signs:  /73   Pulse 98   Temp 97.7 °F (36.5 °C)   Ht 167.6 cm (66\")   Wt 62.5 kg (137 lb 11.2 oz)   SpO2 100%   BMI 22.23 kg/m²   Estimated body mass index is 22.23 kg/m² as calculated from the following:    Height as of this encounter: 167.6 cm (66\").    Weight as of this encounter: 62.5 kg (137 lb 11.2 oz).       BMI is within normal parameters. No other follow-up for BMI required.      Physical Exam  Vitals reviewed.   Constitutional:       General: She is not in acute distress.     Appearance: Normal appearance. She is normal weight. She is not ill-appearing.   HENT:      Head: Normocephalic.      Right Ear: Tympanic membrane, ear canal and external ear normal.      Left Ear: Ear canal and external ear normal.      Ears:      Comments: Left ear effusion     Nose: Congestion present.      Mouth/Throat:      Mouth: Mucous membranes are moist.      Pharynx: Oropharynx is clear.   Eyes:      Extraocular Movements: Extraocular movements intact.      " Conjunctiva/sclera: Conjunctivae normal.      Pupils: Pupils are equal, round, and reactive to light.   Neurological:      Mental Status: She is alert.      Result Review :                   Assessment and Plan   Diagnoses and all orders for this visit:    1. Left leg weakness (Primary)  -     Ambulatory Referral to Neurology  -     EMG & Nerve Conduction Test; Future    I reviewed PT notes, she does have some left leg weakness, unknown etiology.  I do not see mention of possible neuro deficit.  Needs EMG and will refer to neuro as requested.      Reviewed and discussed GI notes and plan.  I recommend discussing any questions, recommendations with them.     Left ear effusion:  flonase BID x 3 days, then daily-proper technique demonstrated.          Follow Up   No follow-ups on file.  Patient was given instructions and counseling regarding her condition or for health maintenance advice. Please see specific information pulled into the AVS if appropriate.       Answers submitted by the patient for this visit:  Other (Submitted on 8/25/2023)  Please describe your symptoms.: Reccomendations by PT  Have you had these symptoms before?: Yes  How long have you been having these symptoms?: Greater than 2 weeks  Primary Reason for Visit (Submitted on 8/25/2023)  What is the primary reason for your visit?: Other    Student present and participated in some aspects of exam/HPI with pt consent.  Pt was seen and examined by me.  DX, MGMT all discussed personally with patient. Documentation done by student and myself.

## 2023-09-06 ENCOUNTER — TELEPHONE (OUTPATIENT)
Dept: FAMILY MEDICINE CLINIC | Facility: CLINIC | Age: 23
End: 2023-09-06
Payer: COMMERCIAL

## 2023-09-06 NOTE — TELEPHONE ENCOUNTER
"  Caller: Aggie Mitchell \"Ana\"    Relationship: Self    Best call back number: [phone]    What orders are you requesting (i.e. lab or imaging): NEUROLOGICAL TESTING    In what timeframe would the patient need to come in: ASAP    Where will you receive your lab/imaging services: THE PATIENT WAS TOLD THAT THIS TEST CANNOT BE SCHEDULED UNTIL IT IS ASSIGNED TO A SPECIFIC LOCATION. PLEASE ADVISE.       "

## 2023-09-13 ENCOUNTER — TELEPHONE (OUTPATIENT)
Dept: GASTROENTEROLOGY | Facility: CLINIC | Age: 23
End: 2023-09-13
Payer: COMMERCIAL

## 2023-09-13 NOTE — TELEPHONE ENCOUNTER
Called number below for [employer] and was advised that the mri was denied.   A peer to peer can be done .  Number [phone] case #[id number].    Update sent to Dr Pearson.

## 2023-09-13 NOTE — TELEPHONE ENCOUNTER
Hub staff attempted to follow warm transfer process and was unsuccessful     Caller: KARMA ( FINANCIAL)    Relationship to patient: Other    Best call back number: [phone]     Patient is needing:  KARMA FROM [facility] FINANCIAL CLEARING IS CALLING REGARDING APPROVAL FOR MRI ON FRIDAY 9/15/23. SHE SENT THE INSURANCE ALL OF THE REQUIRED LABS AND MEDICAL RECORDS, BUT SHE IS WANTING TO SEE WHETHER DR VINES THINKS A PEER TO PEER IS NEEDED. PLEASE CALL HER BACK ASAP.

## 2023-09-14 ENCOUNTER — TELEPHONE (OUTPATIENT)
Dept: GASTROENTEROLOGY | Facility: CLINIC | Age: 23
End: 2023-09-14

## 2023-09-14 NOTE — TELEPHONE ENCOUNTER
"  Caller: Aggie Mitchell \"Ana\"    Relationship: Self    Best call back number: [phone]    What is the best time to reach you: ANYTIME    Who are you requesting to speak with (clinical staff, provider,  specific staff member): NON CLINICAL    Do you know the name of the person who called: NICK    What was the call regarding: PATIENT RETURNING A MISSED CALL FROM NICK. PLEASE CALL PATIENT BACK.      "

## 2023-09-14 NOTE — TELEPHONE ENCOUNTER
"   Caller: Aggie Mitchell \"Ana\"     Relationship: Self     Best call back number: [phone]     What is the best time to reach you: ANYTIME     Who are you requesting to speak with (clinical staff, provider,  specific staff member): NON CLINICAL     Do you know the name of the person who called: NICK     What was the call regarding: PATIENT RETURNING A MISSED CALL FROM NICK. PLEASE CALL PATIENT BACK.           "

## 2023-09-14 NOTE — TELEPHONE ENCOUNTER
Hub staff attempted to follow warm transfer process and was unsuccessful     Caller: [employer]    Relationship to patient:     Best call back number: [phone]     Patient is needing: FINANCIAL CLEARANCE IS WANTING TO KNOW IF CT AND MRI NEED TO BE CANCELLED OR RESCHEDULED SINCE PATIENT'S INSURANCE IS DENYING THEM. PLEASE CALL TODAY, APPT IS STILL SCHEDULED FOR TOMORROW 9/15.

## 2023-09-14 NOTE — TELEPHONE ENCOUNTER
Tell the patient that her insurance has denied both the CT of the abdomen and pelvis with small bowel enterography and the MRI enterography.  I ordered these to r/o small bowel crohn's?  We can discuss in more detail when I see her in follow-up in the office.  I may schedule her for a small bowel follow-through because I am looking for small bowel Crohn's disease?       Send a copy of this report to her PCP. [email or web address]       Called pt and left  for pt to call back.

## 2023-09-22 ENCOUNTER — OFFICE VISIT (OUTPATIENT)
Dept: FAMILY MEDICINE CLINIC | Facility: CLINIC | Age: 23
End: 2023-09-22
Payer: COMMERCIAL

## 2023-09-22 ENCOUNTER — TELEPHONE (OUTPATIENT)
Dept: FAMILY MEDICINE CLINIC | Facility: CLINIC | Age: 23
End: 2023-09-22

## 2023-09-22 VITALS
WEIGHT: 136.6 LBS | BODY MASS INDEX: 21.95 KG/M2 | OXYGEN SATURATION: 100 % | DIASTOLIC BLOOD PRESSURE: 83 MMHG | HEIGHT: 66 IN | HEART RATE: 59 BPM | TEMPERATURE: 97.8 F | SYSTOLIC BLOOD PRESSURE: 122 MMHG

## 2023-09-22 DIAGNOSIS — R42 DIZZINESS: ICD-10-CM

## 2023-09-22 DIAGNOSIS — R53.1 WEAKNESS: Primary | ICD-10-CM

## 2023-09-22 DIAGNOSIS — R53.83 FATIGUE, UNSPECIFIED TYPE: ICD-10-CM

## 2023-09-22 DIAGNOSIS — H53.8 BLURRED VISION, BILATERAL: ICD-10-CM

## 2023-09-22 DIAGNOSIS — R43.0 LOSS OF SMELL: ICD-10-CM

## 2023-09-22 DIAGNOSIS — R43.2 LOSS OF TASTE: ICD-10-CM

## 2023-09-22 DIAGNOSIS — R26.89 LOSS OF BALANCE: ICD-10-CM

## 2023-09-22 DIAGNOSIS — R53.1 WEAKNESS: ICD-10-CM

## 2023-09-22 DIAGNOSIS — R06.09 DYSPNEA ON EXERTION: ICD-10-CM

## 2023-09-22 PROCEDURE — 1159F MED LIST DOCD IN RCRD: CPT | Performed by: NURSE PRACTITIONER

## 2023-09-22 PROCEDURE — 93000 ELECTROCARDIOGRAM COMPLETE: CPT | Performed by: NURSE PRACTITIONER

## 2023-09-22 PROCEDURE — 1160F RVW MEDS BY RX/DR IN RCRD: CPT | Performed by: NURSE PRACTITIONER

## 2023-09-22 PROCEDURE — 99214 OFFICE O/P EST MOD 30 MIN: CPT | Performed by: NURSE PRACTITIONER

## 2023-09-22 NOTE — PROGRESS NOTES
Chief Complaint  Fatigue (Fatigue and weakness on going ), Shortness of Breath (Has some pressure in chest and feels out of breath easily ), and Chest Pain (1 day no numbness on tingling)    Subjective        Aggie Mitchell presents to [facility]  History of Present Illness  Pt here for ongoing fatigue and weakness that seems progressively worsened since last visit.  Recently she noted that her arms became so heavy and fatigued cutting hotdog that she just stopped.  Has had worsening brain fog, loss of smell and taste with negative covid test.  No HA, double vision.  She does have blurred vision at times after laying down with head pressed down into pillow that seems to take longer than it should to clear.  Relatively normal eye exam.  Has decreased appetite.      Worries that she will not be able to continue school due to fatigue, brain fog.      Over last couple days has left sided chest pressure without regard to position,activity.  Has vague dyspnea with exertion with walking and gets fatigued quickly with exertion.  She has feels even dyspneic with eating.  No cough.      Never been dx with allergies, but does admit dizziness was a little improved although not resolved with flonase.      She continues to have loss of balance and weakness in left leg and both arms with left arm worse.      Eats overall healthy diet.  Lives in [place].  Not sure yr built.  No known heavy metal exp, but not sure.       Has mostly unknown FH, but has some distance familial heart issues but does not know what they are.  No current chest pain, palpitations.      Discussed with Aggie again today openly and honestly whether sx could be attributable to anxiety, depression, Munchhausen, some secondary gain.  Patient states she thinks about this often and has even discussed with her therapist who does not think that her symptoms are related to her mental health.  Patient feels like her mental  "health is probably the best its been in a long time and her only real problem currently is her fatigue.    Last visit we discussed starting treatment for fibromyalgia to see if this is helpful however patient has not reacted well to antidepressants in the past and is concerned about starting new medication.      Objective   Vital Signs:  /83   Pulse 59   Temp 97.8 °F (36.6 °C) (Temporal)   Ht 167.6 cm (66\")   Wt 62 kg (136 lb 9.6 oz)   SpO2 100%   BMI 22.05 kg/m²   Estimated body mass index is 22.05 kg/m² as calculated from the following:    Height as of this encounter: 167.6 cm (66\").    Weight as of this encounter: 62 kg (136 lb 9.6 oz).       BMI is within normal parameters. No other follow-up for BMI required.      Physical Exam  Vitals and nursing note reviewed.   Constitutional:       General: She is not in acute distress.     Appearance: She is well-developed. She is not ill-appearing or diaphoretic.      Comments: Fatigued appearing   HENT:      Head: Normocephalic and atraumatic.      Comments: Atopic appearance     Right Ear: Tympanic membrane, ear canal and external ear normal.      Left Ear: Ear canal and external ear normal. A middle ear effusion is present. Tympanic membrane is not erythematous.      Mouth/Throat:      Mouth: Mucous membranes are moist.      Pharynx: No oropharyngeal exudate or posterior oropharyngeal erythema.   Eyes:      General: No scleral icterus.        Right eye: No discharge.         Left eye: No discharge.      Conjunctiva/sclera: Conjunctivae normal.   Cardiovascular:      Rate and Rhythm: Normal rate and regular rhythm.      Heart sounds: Normal heart sounds. No murmur heard.  Pulmonary:      Effort: Pulmonary effort is normal.      Breath sounds: Normal breath sounds.   Abdominal:      General: Bowel sounds are normal.      Palpations: Abdomen is soft.   Musculoskeletal:         General: No deformity.      Cervical back: Neck supple.      Comments:  strength " 3/5 and equal b/l   Lymphadenopathy:      Cervical: Cervical adenopathy present.   Skin:     General: Skin is warm and dry.   Neurological:      Mental Status: She is alert and oriented to person, place, and time.      Cranial Nerves: No facial asymmetry.      Motor: Weakness and pronator drift present. No tremor.      Coordination: Romberg sign negative. Coordination normal. Finger-Nose-Finger Test abnormal. Heel to Silveira Test normal.      Gait: Gait abnormal.      Comments: CN 2-12 grossly intact   Psychiatric:         Behavior: Behavior is cooperative.      Comments: Initially slow speech to answer questions, speech a little slow, more forgetful      Result Review :              ECG 12 Lead    Date/Time: 9/22/2023 2:16 PM  Performed by: Marion Jaquez APRN  Authorized by: Marion Jaquez APRN   Comparison: not compared with previous ECG   Previous ECG: no previous ECG available  Rhythm: sinus rhythm  Rate: normal  BPM: 87  QRS axis: normal    Clinical impression: normal ECG          Assessment and Plan   Diagnoses and all orders for this visit:    1. Weakness (Primary)  -     Acetylcholine Receptor Antibody Panel  -     Musk Antibody  -     MRI Brain With & Without Contrast; Future  -     Heavy Metals Profile II, Blood; Future  -     ECG 12 Lead    2. Fatigue, unspecified type  -     Acetylcholine Receptor Antibody Panel  -     Musk Antibody  -     MRI Brain With & Without Contrast; Future  -     Heavy Metals Profile II, Blood; Future    3. Dyspnea on exertion  -     Adult Transthoracic Echo Complete W/ Cont if Necessary Per Protocol; Future  -     ECG 12 Lead    4. Dizziness  -     Heavy Metals Profile II, Blood; Future    5. Loss of balance  -     MRI Brain With & Without Contrast; Future  -     Heavy Metals Profile II, Blood; Future    6. Loss of taste  -     Heavy Metals Profile II, Blood; Future    7. Blurred vision, bilateral    8. Loss of smell    Patient will continue Flonase for allergy type  symptoms and fluid in her ears.  Her EKG today was reassuringly normal.  I have ordered an echo for dyspnea.  We have done multiple autoimmune panels in the past that have all been negative and inflammatory markers have been negative as well.  With her progressive weakness and abnormal neuro exam today I think best to get MRI and will order labs for muscle weakness.  We have not tested her for heavy metals such as lead and will do this as well.      EMG previously ordered and scheduled in next couple months-will see if we can get this done sooner         Follow Up   No follow-ups on file.  Patient was given instructions and counseling regarding her condition or for health maintenance advice. Please see specific information pulled into the AVS if appropriate.

## 2023-09-22 NOTE — TELEPHONE ENCOUNTER
Caller: [facility]    Best call back number: [phone]     What was the call regarding: [facility] RECEIVED AN EMG REFERRAL, BUT THEY AREN'T IN NETWORK WITH THE PATIENT'S INSURANCE.

## 2023-09-29 ENCOUNTER — TELEPHONE (OUTPATIENT)
Dept: INTERNAL MEDICINE | Facility: CLINIC | Age: 23
End: 2023-09-29

## 2023-09-29 NOTE — TELEPHONE ENCOUNTER
"Caller: Aggie Mitchell \"Ana\"    Relationship: Self    Best call back number: [phone]    Caller requesting test results: PATIENT    What test was performed: LABS    When was the test performed: 9/22/23    Where was the test performed: OFFICE    "

## 2023-09-30 LAB
ACHR BIND AB SER-SCNC: 0.05 NMOL/L (ref 0–0.24)
ACHR BLOCK AB SER-ACNC: 13 % (ref 0–25)
ACHR MOD AB SER QL FC: 7 % (ref 0–45)
ARSENIC BLD-MCNC: 2 UG/L (ref 0–9)
CADMIUM BLD-MCNC: <0.5 UG/L (ref 0–1.2)
LEAD BLDV-MCNC: <1 UG/DL (ref 0–3.4)
MERCURY BLD-MCNC: 2 UG/L (ref 0–14.9)
MUSK AB SER IA-ACNC: <1 U/ML

## 2023-10-03 ENCOUNTER — TELEPHONE (OUTPATIENT)
Dept: FAMILY MEDICINE CLINIC | Facility: CLINIC | Age: 23
End: 2023-10-03
Payer: COMMERCIAL

## 2023-10-03 NOTE — TELEPHONE ENCOUNTER
PATIENT CALLING REGARDING HER REFERRAL FOR NEUROLOGY AND EMG. PATIENT STATES [facility] COULD GET HER IN EITHER TOMORROW OR IN FEBRUARY. PATIENT INQUIRED WHAT SHE SHOULD DO AS SHE COULD NOT GET HER EMG THROUGH [facility] UNTIL NOVEMBER AND SHE WAS INSTRUCTED TO GET HER EMG PRIOR TO HER NEUROLOGIST VISIT. I SPOKE TO BRIANNE WHO STATED IT WAS UP TO THE PATIENT IF SHE WISHED TO WAIT OR BE SEEN NOW. I RELAYED THIS TO PATIENT AND SHE STATED SHE PREFERRED TO BE SEEN BY [facility] AND TOMORROW WAS NOT A GOOD TIME FOR HER. PATIENT STATES SHE WILL WAIT AND BE SEEN BY [facility] AS WELL AS HAVE HER EMG DONE.

## 2023-10-05 ENCOUNTER — OFFICE VISIT (OUTPATIENT)
Dept: OBSTETRICS AND GYNECOLOGY | Facility: CLINIC | Age: 23
End: 2023-10-05
Payer: COMMERCIAL

## 2023-10-05 VITALS
BODY MASS INDEX: 22.05 KG/M2 | HEIGHT: 66 IN | WEIGHT: 137.2 LBS | DIASTOLIC BLOOD PRESSURE: 70 MMHG | SYSTOLIC BLOOD PRESSURE: 111 MMHG

## 2023-10-05 DIAGNOSIS — R53.1 WEAKNESS: ICD-10-CM

## 2023-10-05 DIAGNOSIS — T67.5XXA HEAT EXHAUSTION, INITIAL ENCOUNTER: ICD-10-CM

## 2023-10-05 DIAGNOSIS — R53.83 OTHER FATIGUE: Primary | ICD-10-CM

## 2023-10-05 RX ORDER — ONDANSETRON 4 MG/1
4 TABLET, ORALLY DISINTEGRATING ORAL
COMMUNITY
Start: 2023-05-26 | End: 2023-10-05

## 2023-10-05 RX ORDER — OMEPRAZOLE 20 MG/1
20 CAPSULE, DELAYED RELEASE ORAL DAILY
COMMUNITY

## 2023-10-05 NOTE — PROGRESS NOTES
Chief Complaint   Patient presents with    [facility]     Patient is here today for hormone testing due to health issues she's experiencing.         SUBJECTIVE:     Aggie Mitchell is a 22 y.o.  who presents to [facility] with a problem. She is requesting hormone testing for multiple ongoing problems she has been experiencing for several months. She states her symptoms are very difficult to explain and she thinks that she may have an autoimmune disorder. She states she was told gynecology would who she needs to see to have hormone levels tested. She specifically would like cortisol and ACTH collected. She reports intermittent symptoms such as fatigue, muscle weakness, light headedness, and dehydration. She feels symptoms are triggered by heat and certain foods. At time she is unable to stay home alone due to muscle weakness. She reports symptoms of weakness and light headedness when she is hungry, hunger will also cause severe abdominal pain. She has had extensive work up with other providers with normal findings. States she was recommended to go to [facility], however insurance will not cover this.  Prior to onset of symptoms she was very active,she enjoys being outdoors and hiking, but has been unable to do so recently. Her symptoms not present today.  LMP 9/12/23. She has additionally been evaluated by cardiology and has upcoming Echo scheduled. States that she plans to see  if by the end of the year she still has no answers for current symptoms.     This is my first time meeting Aggie Mitchell  Prior care with Dr Childers. Last AE 12/2022    Past Medical History:   Diagnosis Date    Colon polyp 12/2023    Depression     IBS (irritable bowel syndrome)     Kidney stone     Urinary tract infection       Past Surgical History:   Procedure Laterality Date    COLONOSCOPY N/A 12/21/2022    Procedure: COLONOSCOPY FOR SCREENING with Polypectomy;  Surgeon: Shereen Bone DO;  Location: [facility]  "OR;  Service: Gastroenterology;  Laterality: N/A;  Rectal Polyp with biopsy forceps    ENDOSCOPY N/A 12/21/2022    Procedure: ESOPHAGOGASTRODUODENOSCOPY;  Surgeon: Shereen Bone DO;  Location: [facility] MAIN OR;  Service: Gastroenterology;  Laterality: N/A;  Gastritis, Duodenitis    TEAR DUCT SURGERY Bilateral     UPPER GASTROINTESTINAL ENDOSCOPY      WISDOM TOOTH EXTRACTION          Review of Systems   Constitutional:  Positive for fatigue. Negative for chills and fever.   Gastrointestinal:  Positive for abdominal pain. Negative for abdominal distention, constipation, diarrhea, nausea and vomiting.   Genitourinary:  Negative for menstrual problem, pelvic pain, vaginal bleeding, vaginal discharge and vaginal pain.   Musculoskeletal:  Negative for back pain.   Neurological:  Positive for dizziness, weakness and light-headedness.     OBJECTIVE:   Vitals:    10/05/23 1333   BP: 111/70   Weight: 62.2 kg (137 lb 3.2 oz)   Height: 167.6 cm (65.98\")        Physical Exam  Constitutional:       General: She is not in acute distress.     Appearance: Normal appearance. She is not ill-appearing, toxic-appearing or diaphoretic.   Cardiovascular:      Rate and Rhythm: Normal rate.   Pulmonary:      Effort: Pulmonary effort is normal.   Musculoskeletal:         General: Normal range of motion.      Cervical back: Normal range of motion.   Neurological:      General: No focal deficit present.      Mental Status: She is alert and oriented to person, place, and time.      Cranial Nerves: No cranial nerve deficit.   Skin:     General: Skin is warm and dry.   Psychiatric:         Mood and Affect: Mood normal.         Behavior: Behavior normal.         Thought Content: Thought content normal.         Judgment: Judgment normal.   Vitals and nursing note reviewed.       Assessment/Plan    Diagnoses and all orders for this visit:    1. Other fatigue (Primary)  -     CBC & Differential  -     Comprehensive Metabolic Panel  -     " DHEA-Sulfate  -     Prolactin  -     Progesterone  -     Vitamin D,25-Hydroxy  -     TSH  -     Testosterone  -     Estradiol  -     Follicle Stimulating Hormone  -     Hemoglobin A1c  -     ACTH    2. Weakness  -     CBC & Differential  -     Comprehensive Metabolic Panel  -     DHEA-Sulfate  -     Prolactin  -     Progesterone  -     Vitamin D,25-Hydroxy  -     TSH  -     Testosterone  -     Estradiol  -     Follicle Stimulating Hormone  -     Hemoglobin A1c  -     ACTH    3. Heat exhaustion, initial encounter  -     CBC & Differential  -     Comprehensive Metabolic Panel  -     DHEA-Sulfate  -     Prolactin  -     Progesterone  -     Vitamin D,25-Hydroxy  -     TSH  -     Testosterone  -     Estradiol  -     Follicle Stimulating Hormone  -     Hemoglobin A1c  -     ACTH      Difficult case with multiple complaints, prior normal workup with other providers. Low suspicion symptoms are GYN in nature. I agree with pt likely autoimmune or MH related  Symptoms are not present today, she is feeling well today  She mentions returning to have labs collected when she is feeling poorly again. Advised pt we can collect today or at a later date.   Discussed with pt that likely endocrinology should check ACTH and cortisol levels. Additionally advised that cortisol levels should be collected at certain times of the day  I will check ACTH today, but advised with any abnormal results she would need to f/u with endocrinology. She verbalizes understanding  She is current on AE at this time    Return in about 3 months (around 1/5/2024) for Annual physical.      Bobbi Crowley, APRN  10/5/2023  18:54 EDT

## 2023-10-06 LAB
25(OH)D3+25(OH)D2 SERPL-MCNC: 22.1 NG/ML (ref 30–100)
ACTH PLAS-MCNC: 15.8 PG/ML (ref 7.2–63.3)
ALBUMIN SERPL-MCNC: 4.4 G/DL (ref 4–5)
ALBUMIN/GLOB SERPL: 1.7 {RATIO} (ref 1.2–2.2)
ALP SERPL-CCNC: 79 IU/L (ref 44–121)
ALT SERPL-CCNC: 14 IU/L (ref 0–32)
AST SERPL-CCNC: 14 IU/L (ref 0–40)
BASOPHILS # BLD AUTO: 0.1 X10E3/UL (ref 0–0.2)
BASOPHILS NFR BLD AUTO: 1 %
BILIRUB SERPL-MCNC: 0.4 MG/DL (ref 0–1.2)
BUN SERPL-MCNC: 11 MG/DL (ref 6–20)
BUN/CREAT SERPL: 22 (ref 9–23)
CALCIUM SERPL-MCNC: 8.9 MG/DL (ref 8.7–10.2)
CHLORIDE SERPL-SCNC: 105 MMOL/L (ref 96–106)
CO2 SERPL-SCNC: 21 MMOL/L (ref 20–29)
CREAT SERPL-MCNC: 0.5 MG/DL (ref 0.57–1)
DHEA-S SERPL-MCNC: 343 UG/DL (ref 110–431.7)
EGFRCR SERPLBLD CKD-EPI 2021: 134 ML/MIN/1.73
EOSINOPHIL # BLD AUTO: 0 X10E3/UL (ref 0–0.4)
EOSINOPHIL NFR BLD AUTO: 1 %
ERYTHROCYTE [DISTWIDTH] IN BLOOD BY AUTOMATED COUNT: 11.8 % (ref 11.7–15.4)
ESTRADIOL SERPL-MCNC: 93 PG/ML
FSH SERPL-ACNC: 1.8 MIU/ML
GLOBULIN SER CALC-MCNC: 2.6 G/DL (ref 1.5–4.5)
GLUCOSE SERPL-MCNC: 79 MG/DL (ref 70–99)
HBA1C MFR BLD: 5.2 % (ref 4.8–5.6)
HCT VFR BLD AUTO: 40.2 % (ref 34–46.6)
HGB BLD-MCNC: 13.2 G/DL (ref 11.1–15.9)
IMM GRANULOCYTES # BLD AUTO: 0 X10E3/UL (ref 0–0.1)
IMM GRANULOCYTES NFR BLD AUTO: 0 %
LYMPHOCYTES # BLD AUTO: 2.3 X10E3/UL (ref 0.7–3.1)
LYMPHOCYTES NFR BLD AUTO: 35 %
MCH RBC QN AUTO: 30.3 PG (ref 26.6–33)
MCHC RBC AUTO-ENTMCNC: 32.8 G/DL (ref 31.5–35.7)
MCV RBC AUTO: 92 FL (ref 79–97)
MONOCYTES # BLD AUTO: 0.3 X10E3/UL (ref 0.1–0.9)
MONOCYTES NFR BLD AUTO: 5 %
NEUTROPHILS # BLD AUTO: 3.8 X10E3/UL (ref 1.4–7)
NEUTROPHILS NFR BLD AUTO: 58 %
PLATELET # BLD AUTO: 260 X10E3/UL (ref 150–450)
POTASSIUM SERPL-SCNC: 4.1 MMOL/L (ref 3.5–5.2)
PROGEST SERPL-MCNC: 4 NG/ML
PROLACTIN SERPL-MCNC: 13.3 NG/ML (ref 4.8–23.3)
PROT SERPL-MCNC: 7 G/DL (ref 6–8.5)
RBC # BLD AUTO: 4.35 X10E6/UL (ref 3.77–5.28)
SODIUM SERPL-SCNC: 142 MMOL/L (ref 134–144)
TESTOST SERPL-MCNC: 23 NG/DL (ref 13–71)
TSH SERPL DL<=0.005 MIU/L-ACNC: 1.08 UIU/ML (ref 0.45–4.5)
WBC # BLD AUTO: 6.5 X10E3/UL (ref 3.4–10.8)

## 2023-10-09 ENCOUNTER — TELEPHONE (OUTPATIENT)
Dept: OBSTETRICS AND GYNECOLOGY | Facility: CLINIC | Age: 23
End: 2023-10-09
Payer: COMMERCIAL

## 2023-10-09 ENCOUNTER — PATIENT MESSAGE (OUTPATIENT)
Dept: OBSTETRICS AND GYNECOLOGY | Facility: CLINIC | Age: 23
End: 2023-10-09
Payer: COMMERCIAL

## 2023-10-09 ENCOUNTER — PATIENT ROUNDING (BHMG ONLY) (OUTPATIENT)
Dept: OBSTETRICS AND GYNECOLOGY | Facility: CLINIC | Age: 23
End: 2023-10-09
Payer: COMMERCIAL

## 2023-10-09 NOTE — TELEPHONE ENCOUNTER
"----- Message from Lubna Welch RN sent at 10/9/2023 12:05 PM EDT -----  Regarding: Question regarding COMPREHENSIVE METABOLIC PANEL  Contact: [phone]  My Chart. Keo pt. Message #2. Visit 10/5/23. Asking about her creatinine level.  0.5. Vitamin D level at 22/1. Lots of labs drawn. Thank you.      ----- Message -----  From: Aggie Mitchell \"Aan\"  Sent: 10/6/2023   9:27 PM EDT  To: [facility]  Subject: Question regarding COMPREHENSIVE METABOLIC P#    I have frequently had "low" in the yellow range creatinine, do you think this could indicate anything even though it's not "Dangerously" low?   "

## 2023-10-09 NOTE — TELEPHONE ENCOUNTER
"----- Message from Lubna Welch RN sent at 10/9/2023 12:03 PM EDT -----  Regarding: Question regarding ACTH  Contact: [phone]  My Chart. Keo pt. Initial visit 10/5/23 Weakness, heat exhaustion, fatigue. Wants to retake test when she is not feeling well. Thank you.      ----- Message -----  From: Aggie Mitchell \"Ana\"  Sent: 10/6/2023   9:26 PM EDT  To: [facility]  Subject: Question regarding ACTH                          Can I have this retaken when I am not feeling well and/or at another time? I've read here in the test results and online that it needs to be done in the morning and also other things are recfomeneded to do/not do so many hours before etc.   "

## 2023-10-09 NOTE — PROGRESS NOTES
My chart message has been sent to the patient for PATIENT ROUNDING with [facility].

## 2023-10-16 DIAGNOSIS — R53.82 CHRONIC FATIGUE: Primary | ICD-10-CM

## 2023-10-16 NOTE — TELEPHONE ENCOUNTER
Please let the pt know as we discussed in the office this and the cortisol level she requested likely needs to be collected and reviewed by an endocrinologist. I am glad to enter a referral if she would like.

## 2023-10-23 ENCOUNTER — HOSPITAL ENCOUNTER (OUTPATIENT)
Dept: CARDIOLOGY | Facility: HOSPITAL | Age: 23
Discharge: HOME | End: 2023-10-23
Admitting: NURSE PRACTITIONER
Payer: COMMERCIAL

## 2023-10-23 VITALS — WEIGHT: 137 LBS | BODY MASS INDEX: 22.82 KG/M2 | HEIGHT: 65 IN

## 2023-10-23 DIAGNOSIS — R06.09 DYSPNEA ON EXERTION: ICD-10-CM

## 2023-10-23 LAB
AORTIC DIMENSIONLESS INDEX: 0.9 (DI)
BH CV ECHO MEAS - ACS: 2.24 CM
BH CV ECHO MEAS - AO MAX PG: 10.1 MMHG
BH CV ECHO MEAS - AO MEAN PG: 4.6 MMHG
BH CV ECHO MEAS - AO ROOT DIAM: 2.9 CM
BH CV ECHO MEAS - AO V2 MAX: 158.8 CM/SEC
BH CV ECHO MEAS - AO V2 VTI: 31 CM
BH CV ECHO MEAS - AVA(I,D): 2.6 CM2
BH CV ECHO MEAS - EDV(CUBED): 64.3 ML
BH CV ECHO MEAS - EDV(MOD-SP2): 96 ML
BH CV ECHO MEAS - EDV(MOD-SP4): 78 ML
BH CV ECHO MEAS - EF(MOD-BP): 70.8 %
BH CV ECHO MEAS - EF(MOD-SP2): 71.9 %
BH CV ECHO MEAS - EF(MOD-SP4): 67.9 %
BH CV ECHO MEAS - ESV(CUBED): 17.4 ML
BH CV ECHO MEAS - ESV(MOD-SP2): 27 ML
BH CV ECHO MEAS - ESV(MOD-SP4): 25 ML
BH CV ECHO MEAS - FS: 35.3 %
BH CV ECHO MEAS - IVS/LVPW: 1.08 CM
BH CV ECHO MEAS - IVSD: 0.81 CM
BH CV ECHO MEAS - LAT PEAK E' VEL: 14.9 CM/SEC
BH CV ECHO MEAS - LV DIASTOLIC VOL/BSA (35-75): 46.3 CM2
BH CV ECHO MEAS - LV MASS(C)D: 90.9 GRAMS
BH CV ECHO MEAS - LV MAX PG: 9.6 MMHG
BH CV ECHO MEAS - LV MEAN PG: 3.8 MMHG
BH CV ECHO MEAS - LV SYSTOLIC VOL/BSA (12-30): 14.8 CM2
BH CV ECHO MEAS - LV V1 MAX: 154.9 CM/SEC
BH CV ECHO MEAS - LV V1 VTI: 26.7 CM
BH CV ECHO MEAS - LVIDD: 4 CM
BH CV ECHO MEAS - LVIDS: 2.6 CM
BH CV ECHO MEAS - LVOT AREA: 3.1 CM2
BH CV ECHO MEAS - LVOT DIAM: 1.98 CM
BH CV ECHO MEAS - LVPWD: 0.75 CM
BH CV ECHO MEAS - MED PEAK E' VEL: 13.2 CM/SEC
BH CV ECHO MEAS - MV A DUR: 0.1 SEC
BH CV ECHO MEAS - MV A MAX VEL: 65.1 CM/SEC
BH CV ECHO MEAS - MV DEC SLOPE: 812.1 CM/SEC2
BH CV ECHO MEAS - MV DEC TIME: 146 SEC
BH CV ECHO MEAS - MV E MAX VEL: 70.7 CM/SEC
BH CV ECHO MEAS - MV E/A: 1.09
BH CV ECHO MEAS - MV MAX PG: 3.9 MMHG
BH CV ECHO MEAS - MV MEAN PG: 1.6 MMHG
BH CV ECHO MEAS - MV P1/2T: 37.4 MSEC
BH CV ECHO MEAS - MV V2 VTI: 18.1 CM
BH CV ECHO MEAS - MVA(P1/2T): 5.9 CM2
BH CV ECHO MEAS - MVA(VTI): 4.5 CM2
BH CV ECHO MEAS - PA ACC TIME: 0.12 SEC
BH CV ECHO MEAS - PA V2 MAX: 91.2 CM/SEC
BH CV ECHO MEAS - PULM DIAS VEL: 39.4 CM/SEC
BH CV ECHO MEAS - PULM S/D: 1.92
BH CV ECHO MEAS - PULM SYS VEL: 75.8 CM/SEC
BH CV ECHO MEAS - QP/QS: 0.5
BH CV ECHO MEAS - RAP SYSTOLE: 3 MMHG
BH CV ECHO MEAS - RV MAX PG: 2.7 MMHG
BH CV ECHO MEAS - RV V1 MAX: 82.3 CM/SEC
BH CV ECHO MEAS - RV V1 VTI: 18.6 CM
BH CV ECHO MEAS - RVOT DIAM: 1.68 CM
BH CV ECHO MEAS - SI(MOD-SP2): 41 ML/M2
BH CV ECHO MEAS - SI(MOD-SP4): 31.5 ML/M2
BH CV ECHO MEAS - SV(LVOT): 82 ML
BH CV ECHO MEAS - SV(MOD-SP2): 69 ML
BH CV ECHO MEAS - SV(MOD-SP4): 53 ML
BH CV ECHO MEAS - SV(RVOT): 41.2 ML
BH CV ECHO MEAS - TAPSE (>1.6): 2.4 CM
BH CV ECHO MEASUREMENTS AVERAGE E/E' RATIO: 5.03
BH CV VAS BP RIGHT ARM: NORMAL MMHG
BH CV XLRA - RV BASE: 3.4 CM
BH CV XLRA - RV LENGTH: 5.7 CM
BH CV XLRA - RV MID: 2.16 CM
BH CV XLRA - TDI S': 14.1 CM/SEC
LEFT ATRIUM VOLUME INDEX: 26.4 ML/M2
SINUS: 2.6 CM
STJ: 2.1 CM

## 2023-10-23 PROCEDURE — 93306 TTE W/DOPPLER COMPLETE: CPT | Performed by: INTERNAL MEDICINE

## 2023-10-23 PROCEDURE — 93306 TTE W/DOPPLER COMPLETE: CPT

## 2023-11-01 ENCOUNTER — TELEPHONE (OUTPATIENT)
Dept: ENDOCRINOLOGY | Age: 23
End: 2023-11-01

## 2023-11-01 NOTE — TELEPHONE ENCOUNTER
"    Hub staff attempted to follow warm transfer process and was unsuccessful     Caller: Aggie Mitchell \"Ana\"    Relationship to patient: Self    Best call back number: [phone]    Patient is needing: PATIENT RETURNING CALL FROM [facility] TO SCHEDULE FROM REFERRAL, IF CALLING TODAY PLEASE CALL AFTER 2:30PM   "

## (undated) DEVICE — SINGLE-USE BIOPSY FORCEPS: Brand: RADIAL JAW 4

## (undated) DEVICE — GOWN ISOL W/THUMB UNIV BLU BX/15

## (undated) DEVICE — MSK ENDO PORT O2 POM ELITE CURAPLEX A/

## (undated) DEVICE — FLEX ADVANTAGE 1500CC: Brand: FLEX ADVANTAGE

## (undated) DEVICE — GOWN PROC ENDOARMOR GI LVL3 HY/SHLD UNIV

## (undated) DEVICE — BITEBLOCK OMNI BLOC

## (undated) DEVICE — Device

## (undated) DEVICE — KT ORCA ORCAPOD DISP STRL

## (undated) DEVICE — LAB CORP CLOTEST UREASE

## (undated) DEVICE — VIAL FORMLN CAP 10PCT 20ML